# Patient Record
Sex: MALE | Race: WHITE | NOT HISPANIC OR LATINO | Employment: OTHER | URBAN - NONMETROPOLITAN AREA
[De-identification: names, ages, dates, MRNs, and addresses within clinical notes are randomized per-mention and may not be internally consistent; named-entity substitution may affect disease eponyms.]

---

## 2017-01-27 RX ORDER — CITALOPRAM 40 MG/1
TABLET ORAL
Qty: 30 TABLET | Refills: 0 | Status: SHIPPED | OUTPATIENT
Start: 2017-01-27

## 2017-02-02 ENCOUNTER — LAB (OUTPATIENT)
Dept: LAB | Facility: OTHER | Age: 72
End: 2017-02-02

## 2017-02-02 ENCOUNTER — OFFICE VISIT (OUTPATIENT)
Dept: FAMILY MEDICINE CLINIC | Facility: CLINIC | Age: 72
End: 2017-02-02

## 2017-02-02 VITALS — HEIGHT: 64 IN | SYSTOLIC BLOOD PRESSURE: 132 MMHG | DIASTOLIC BLOOD PRESSURE: 76 MMHG

## 2017-02-02 DIAGNOSIS — E11.621 DIABETIC ULCER OF RIGHT FOOT ASSOCIATED WITH TYPE 2 DIABETES MELLITUS (HCC): Primary | ICD-10-CM

## 2017-02-02 DIAGNOSIS — L97.519 DIABETIC ULCER OF RIGHT FOOT ASSOCIATED WITH TYPE 2 DIABETES MELLITUS (HCC): Primary | ICD-10-CM

## 2017-02-02 DIAGNOSIS — L97.519 DIABETIC ULCER OF RIGHT FOOT ASSOCIATED WITH TYPE 2 DIABETES MELLITUS (HCC): ICD-10-CM

## 2017-02-02 DIAGNOSIS — E11.621 DIABETIC ULCER OF RIGHT FOOT ASSOCIATED WITH TYPE 2 DIABETES MELLITUS (HCC): ICD-10-CM

## 2017-02-02 LAB
ALBUMIN SERPL-MCNC: 3.2 G/DL (ref 3.2–5.5)
ALBUMIN/GLOB SERPL: 0.7 G/DL (ref 1–3)
ALP SERPL-CCNC: 153 U/L (ref 15–121)
ALT SERPL W P-5'-P-CCNC: 29 U/L (ref 10–60)
ANION GAP SERPL CALCULATED.3IONS-SCNC: 14 MMOL/L (ref 5–15)
AST SERPL-CCNC: 37 U/L (ref 10–60)
BASOPHILS # BLD AUTO: 0.04 10*3/MM3 (ref 0–0.2)
BASOPHILS NFR BLD AUTO: 0.2 % (ref 0–2)
BILIRUB SERPL-MCNC: 2 MG/DL (ref 0.2–1)
BUN BLD-MCNC: 39 MG/DL (ref 8–25)
BUN/CREAT SERPL: 19.5 (ref 7–25)
CALCIUM SPEC-SCNC: 9.4 MG/DL (ref 8.4–10.8)
CHLORIDE SERPL-SCNC: 100 MMOL/L (ref 100–112)
CO2 SERPL-SCNC: 22 MMOL/L (ref 20–32)
CREAT BLD-MCNC: 2 MG/DL (ref 0.4–1.3)
DEPRECATED RDW RBC AUTO: 51.4 FL (ref 35.1–43.9)
EOSINOPHIL # BLD AUTO: 0.08 10*3/MM3 (ref 0–0.7)
EOSINOPHIL NFR BLD AUTO: 0.5 % (ref 0–7)
ERYTHROCYTE [DISTWIDTH] IN BLOOD BY AUTOMATED COUNT: 16.2 % (ref 11.5–14.5)
ERYTHROCYTE [SEDIMENTATION RATE] IN BLOOD: 50 MM/HR (ref 0–20)
GFR SERPL CREATININE-BSD FRML MDRD: 33 ML/MIN/1.73 (ref 49–113)
GLOBULIN UR ELPH-MCNC: 4.7 GM/DL (ref 2.5–4.6)
GLUCOSE BLD-MCNC: 104 MG/DL (ref 70–100)
HCT VFR BLD AUTO: 43.9 % (ref 39–49)
HGB BLD-MCNC: 14.5 G/DL (ref 13.7–17.3)
LYMPHOCYTES # BLD AUTO: 1.41 10*3/MM3 (ref 0.6–4.2)
LYMPHOCYTES NFR BLD AUTO: 8.5 % (ref 10–50)
MCH RBC QN AUTO: 28.9 PG (ref 26.5–34)
MCHC RBC AUTO-ENTMCNC: 33 G/DL (ref 31.5–36.3)
MCV RBC AUTO: 87.6 FL (ref 80–98)
MONOCYTES # BLD AUTO: 1.67 10*3/MM3 (ref 0–0.9)
MONOCYTES NFR BLD AUTO: 10 % (ref 0–12)
NEUTROPHILS # BLD AUTO: 13.47 10*3/MM3 (ref 2–8.6)
NEUTROPHILS NFR BLD AUTO: 80.8 % (ref 37–80)
PLATELET # BLD AUTO: 411 10*3/MM3 (ref 150–450)
PMV BLD AUTO: 9.7 FL (ref 8–12)
POTASSIUM BLD-SCNC: 4 MMOL/L (ref 3.4–5.4)
PROT SERPL-MCNC: 7.9 G/DL (ref 6.7–8.2)
RBC # BLD AUTO: 5.01 10*6/MM3 (ref 4.37–5.74)
SODIUM BLD-SCNC: 136 MMOL/L (ref 134–146)
WBC NRBC COR # BLD: 16.67 10*3/MM3 (ref 3.2–9.8)

## 2017-02-02 PROCEDURE — 85025 COMPLETE CBC W/AUTO DIFF WBC: CPT | Performed by: FAMILY MEDICINE

## 2017-02-02 PROCEDURE — 99214 OFFICE O/P EST MOD 30 MIN: CPT | Performed by: FAMILY MEDICINE

## 2017-02-02 PROCEDURE — 36415 COLL VENOUS BLD VENIPUNCTURE: CPT | Performed by: FAMILY MEDICINE

## 2017-02-02 PROCEDURE — 80053 COMPREHEN METABOLIC PANEL: CPT | Performed by: FAMILY MEDICINE

## 2017-02-02 PROCEDURE — 85651 RBC SED RATE NONAUTOMATED: CPT | Performed by: FAMILY MEDICINE

## 2017-02-02 RX ORDER — SULFAMETHOXAZOLE AND TRIMETHOPRIM 800; 160 MG/1; MG/1
1 TABLET ORAL 2 TIMES DAILY
Qty: 20 TABLET | Refills: 1 | Status: SHIPPED | OUTPATIENT
Start: 2017-02-02 | End: 2017-02-13 | Stop reason: SDUPTHER

## 2017-02-02 RX ORDER — ATORVASTATIN CALCIUM 40 MG/1
40 TABLET, FILM COATED ORAL DAILY
Qty: 30 TABLET | Refills: 5 | Status: SHIPPED | OUTPATIENT
Start: 2017-02-02

## 2017-02-02 NOTE — PROGRESS NOTES
Please call the patient regarding his abnormal result.  Thyroid shows presence of infection most likely and inflammation.  Also his kidney functions are significantly higher.  Ask if he sees a nephrologist and if not we need a referral ASAP

## 2017-02-02 NOTE — PROGRESS NOTES
Subjective   Josiah Del Castillo is a 71 y.o. male.  He has an open area on his right foot.  His wife said that she has tried to prop his foot up with a pillow but he still has a habit of rubbing the foot on the sheets.  About a week ago became open and it has been draining and his foot is becoming increasingly red and painful    History of Present Illness     The following portions of the patient's history were reviewed and updated as appropriate: allergies, current medications, past family history, past medical history, past social history, past surgical history and problem list.    Review of Systems   Constitutional: Positive for fatigue. Negative for activity change, appetite change, diaphoresis, fever and unexpected weight change.   HENT: Negative for congestion, ear pain, hearing loss, sinus pressure, sore throat, tinnitus, trouble swallowing and voice change.    Eyes: Negative.    Respiratory: Negative.    Cardiovascular: Negative.    Gastrointestinal: Negative for abdominal distention, abdominal pain, blood in stool, constipation, diarrhea, nausea and vomiting.   Endocrine: Negative.    Genitourinary: Negative for decreased urine volume, dysuria, frequency, hematuria and urgency.   Musculoskeletal: Positive for arthralgias, back pain, gait problem, joint swelling and myalgias.   Skin: Positive for wound.        R foot   Allergic/Immunologic: Negative.    Neurological: Positive for tremors, speech difficulty and weakness. Negative for dizziness, seizures, syncope, numbness and headaches.   Hematological: Negative.    Psychiatric/Behavioral: Negative for dysphoric mood and sleep disturbance. The patient is not nervous/anxious.        Objective   Physical Exam   Constitutional: He is oriented to person, place, and time. He appears well-developed and well-nourished. No distress.   HENT:   Head: Normocephalic and atraumatic.   Nose: Nose normal.   Mouth/Throat: Oropharynx is clear and moist.   Eyes: Conjunctivae and  EOM are normal. Pupils are equal, round, and reactive to light.   Neck: Normal range of motion. Neck supple. No JVD present. No tracheal deviation present. No thyromegaly present.   Cardiovascular: Normal rate, regular rhythm, normal heart sounds and intact distal pulses.    No murmur heard.  Pulmonary/Chest: Effort normal and breath sounds normal. He has no wheezes. He exhibits no tenderness.   Abdominal: Soft. Bowel sounds are normal. He exhibits no distension and no mass. There is no tenderness.   Musculoskeletal: Normal range of motion. He exhibits no edema or tenderness.   Induration and redness right foot medially from midfoot to the great toe with an open area centered over the first MTP joint with loose body skin, open area about 2 cm   Lymphadenopathy:     He has no cervical adenopathy.   Neurological: He is alert and oriented to person, place, and time. He displays abnormal reflex. He exhibits abnormal muscle tone. Coordination abnormal.   Skin: Skin is warm and dry. No rash noted. There is erythema. No pallor.   Psychiatric: He has a normal mood and affect.   Nursing note and vitals reviewed.      Assessment/Plan   Josiah was seen today for puncture wound.    Diagnoses and all orders for this visit:    Diabetic ulcer of right foot associated with type 2 diabetes mellitus  -     Ambulatory Referral to Podiatry  -     CBC & Differential; Future  -     Comprehensive Metabolic Panel; Future  -     Sedimentation Rate; Future    Other orders  -     sulfamethoxazole-trimethoprim (BACTRIM DS) 800-160 MG per tablet; Take 1 tablet by mouth 2 (Two) Times a Day.  -     atorvastatin (LIPITOR) 40 MG tablet; Take 1 tablet by mouth Daily.     will make podiatry referral, dressing is done today as well for continued dressing changes at home until then.  We'll see about getting him into wound management after the podiatry visit if indicated.  Will go ahead and start antibiotics and is a family is advised to take him to ER  for any signs of spreading redness infection or worsening.  Keep the foot elevated when possible.

## 2017-02-06 ENCOUNTER — OFFICE VISIT (OUTPATIENT)
Dept: PODIATRY | Facility: CLINIC | Age: 72
End: 2017-02-06

## 2017-02-06 VITALS — WEIGHT: 220 LBS | HEIGHT: 64 IN | BODY MASS INDEX: 37.56 KG/M2

## 2017-02-06 DIAGNOSIS — L97.513 FOOT ULCER, RIGHT, WITH NECROSIS OF MUSCLE (HCC): ICD-10-CM

## 2017-02-06 DIAGNOSIS — I73.9 PAD (PERIPHERAL ARTERY DISEASE) (HCC): Primary | ICD-10-CM

## 2017-02-06 DIAGNOSIS — L89.893: ICD-10-CM

## 2017-02-06 DIAGNOSIS — E11.42 DIABETIC POLYNEUROPATHY ASSOCIATED WITH TYPE 2 DIABETES MELLITUS (HCC): ICD-10-CM

## 2017-02-06 PROCEDURE — 99203 OFFICE O/P NEW LOW 30 MIN: CPT | Performed by: PODIATRIST

## 2017-02-06 NOTE — PROGRESS NOTES
Josiah Del Castillo  1945  71 y.o. male   PCP: Amirah Archuleta MD  BS: 83 this morning per patient's wife  Patient presents today wound on his right great toe.    02/06/2017  Chief Complaint   Patient presents with   • Right Foot - Wound Check           History of Present Illness    Mr Del Castillo is a 71-year-old male with history of diabetes and CVA with subsequent lower extremity contractures.  He presents for evaluation of right foot wound.  He states he believes he bumped this on something in the past couple of weeks was not sure what.  He was previously evaluated by Dr. Archuleta who referred him.  She did run preliminary labs which showed increased CRP and leukocytosis.  She started him on by mouth Bactrim.  His wife is been caring for the wound with peroxide, alcohol and bandaging.  He denies any current nausea, vomiting fever or chills        Past Medical History   Diagnosis Date   • Abscess of scalp    • Acute bronchitis    • Carotid artery stenosis      Athrosclerosis complete right ICA 70% left ICA   • Cellulitis of head (any part, except face)      Left ear     • Cellulitis of scalp    • Cerebrovascular accident    • Dizziness and giddiness    • Essential hypertension    • Hemiplegia      (L)      • History of respiratory therapy 06/19/2013     Nebulizer Treatment 25921 (1)  - RNatacha Negro   • Hyperlipidemia    • Hypertensive disorder    • Impacted cerumen    • Impaired glucose tolerance associated with insulin receptor abnormality    • Malignant neoplasm of prostate      Screening for malignant neoplasm of prostate    • 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    • Paronychia of toe    • Tinea cruris    • Transient cerebral ischemia    • Type 2 diabetes mellitus          Past Surgical History   Procedure Laterality Date   • Hernia repair       Age 11   • Coronary artery bypass graft  2000      x 3    • Other surgical history  06/19/2013     Remove Impacted Cerumen 92405 (1)  - RNatacha  Marky         Family History   Problem Relation Age of Onset   • Diabetes Other    • Heart disease Other          Social History     Social History   • Marital status:      Spouse name: N/A   • Number of children: N/A   • Years of education: N/A     Occupational History   • Not on file.     Social History Main Topics   • Smoking status: Former Smoker   • Smokeless tobacco: Not on file   • Alcohol use No   • Drug use: Not on file   • Sexual activity: Not on file     Other Topics Concern   • Not on file     Social History Narrative         Current Outpatient Prescriptions   Medication Sig Dispense Refill   • albuterol (PROVENTIL) (2.5 MG/3ML) 0.083% nebulizer solution Take 2.5 mg by nebulization Every 6 (Six) Hours As Needed.     • atorvastatin (LIPITOR) 40 MG tablet Take 1 tablet by mouth Daily. 30 tablet 5   • citalopram (CeleXA) 40 MG tablet TAKE 1 TABLET BY MOUTH DAILY **NEEDS LAB WORK** 30 tablet 0   • Cyanocobalamin-Methylcobalamin 600-600 MCG sublingual tablet Place 1 tablet under the tongue Daily.     • glucose blood (SOLUS V2 TEST) test strip Use one strip to test blood sugar 2 times every day for diabetes     • Insulin Glargine (LANTUS SOLOSTAR) 100 UNIT/ML injection pen Inject  under the skin. 20 units Subcutaneous every night at bed time for diabetes     • Insulin Pen Needle 30G X 8 MM misc 1 each Daily. 30 each 5   • sulfamethoxazole-trimethoprim (BACTRIM DS) 800-160 MG per tablet Take 1 tablet by mouth 2 (Two) Times a Day. 20 tablet 1   • TRUETEST TEST test strip CHECK BLOOD SUGAR TWICE DAILY 100 each 5   • valsartan (DIOVAN) 320 MG tablet TAKE 1 TABLET BY MOUTH DAILY 90 tablet 0   • warfarin (COUMADIN) 1 MG tablet Take  by mouth. 3 tablets daily     • warfarin (COUMADIN) 4 MG tablet Take 1 tablet by mouth daily. 30 tablet 5   • warfarin (COUMADIN) 5 MG tablet Take  by mouth. 1 TABLET EVERY PM       No current facility-administered medications for this visit.          OBJECTIVE    Visit Vitals  "  • Ht 64\" (162.6 cm)   • Wt 220 lb (99.8 kg)   • BMI 37.76 kg/m2         Review of Systems   Constitutional:  Denies recent weight loss, weight gain, fever or chills, no change in exercise tolerance  Musculoskeletal: Toe pain.   Skin:  Right foot ulcer.  Neurological:  Burning sensations to feet b/l.  Psychiatric/Behavioral: Denies depression    Physical Exam   Constitutional: He appears well-developed and well-nourished.   HEENT: Normocephalic. Atraumatic  CV: No tenderness. RRR  Resp: Non-labored respiration. No wheezes.   Lymphatic: No lymphadenopathy.   Psychiatric: He has a normal mood and affect. her   behavior is normal.      Lower Extremity Exam:  Vascular: DP/PT pulses non-palpable +signals on doppler.   Negative hair growth.   1+  Edema  Toes cool, CFT delayed  Neuro: Protective sensation absent, b/l.  DTRs hyperreflexive  Integument: No lesions.  Large 2.0 x 3.0 cm FT ulcer to plantar medial 1st MTPJ with probe to capsule/sesamoid  Mild periwound erythema, no purulence, no malodor. Some sloughing periwound epidermis  Web spaces c/d/i  Musculoskeletal: LE muscle strength 4/5.   Gait in wheelchair  Flexure contracture of hip, knee on right   Mild HAV with decreased ROM  Ankle ROM decreased without pain or crepitus  STJ ROM decreased          ASSESSMENT AND PLAN    Josiah was seen today for wound check.    Diagnoses and all orders for this visit:    PAD (peripheral artery disease)  -     Doppler Ankle Brachial Index Single Level CAR; Future    Foot ulcer, right, with necrosis of muscle    Diabetic polyneuropathy associated with type 2 diabetes mellitus    -Comprehensive DM foot exam performed. Pt educated on importance of tight glucose control and daily foot checks. Pt education materials dispensed.  -Pt educated on standard wound care staples including offloading, dressing changes, and serial debridements.  -PRAFO boot for offloading dispensed, to be worn at all times  -Concern for ability to heal wound due " to potential peripheral arterial disease. SHANNON with TBI ordered.  -Will have pt follow up with me in [place] following SHANNON next week            This document has been electronically signed by Narciso Maza DPM on February 7, 2017 2:41 PM     Narciso Maza DPM  2/7/2017  2:32 PM

## 2017-02-09 DIAGNOSIS — R94.4 ABNORMAL KIDNEY FUNCTION STUDY: ICD-10-CM

## 2017-02-09 DIAGNOSIS — N28.9 ABNORMAL KIDNEY FUNCTION: Primary | ICD-10-CM

## 2017-02-13 ENCOUNTER — OFFICE VISIT (OUTPATIENT)
Dept: PODIATRY | Facility: CLINIC | Age: 72
End: 2017-02-13

## 2017-02-13 VITALS — HEIGHT: 64 IN | WEIGHT: 220 LBS | BODY MASS INDEX: 37.56 KG/M2

## 2017-02-13 DIAGNOSIS — E11.42 DIABETIC POLYNEUROPATHY ASSOCIATED WITH TYPE 2 DIABETES MELLITUS (HCC): ICD-10-CM

## 2017-02-13 DIAGNOSIS — M79.671 RIGHT FOOT PAIN: ICD-10-CM

## 2017-02-13 DIAGNOSIS — IMO0001 WHEEL CHAIR AS AMBULATORY AID: ICD-10-CM

## 2017-02-13 DIAGNOSIS — I73.9 PAD (PERIPHERAL ARTERY DISEASE) (HCC): ICD-10-CM

## 2017-02-13 DIAGNOSIS — L97.513 FOOT ULCER WITH NECROSIS OF MUSCLE, RIGHT (HCC): Primary | ICD-10-CM

## 2017-02-13 PROCEDURE — 99214 OFFICE O/P EST MOD 30 MIN: CPT | Performed by: PODIATRIST

## 2017-02-13 RX ORDER — SULFAMETHOXAZOLE AND TRIMETHOPRIM 800; 160 MG/1; MG/1
1 TABLET ORAL 2 TIMES DAILY
Qty: 20 TABLET | Refills: 1 | Status: SHIPPED | OUTPATIENT
Start: 2017-02-13 | End: 2017-04-05 | Stop reason: HOSPADM

## 2017-02-13 NOTE — PROGRESS NOTES
Josiah Del Castillo  1945  71 y.o. male   PCP: Amirah Archuleta MD  BS: 83 this morning per patient's wife  Patient presents today wound on his right great toe.    02/13/2017  Chief Complaint   Patient presents with   • Right Foot - Wound Check           History of Present Illness    Mr Del Castillo is a 71-year-old male with history of diabetes and CVA with subsequent lower extremity contractures.  He presents for f/u evaluation of right foot wound.  He completed a round of Bactrim yesterday. We sent him for ABIs at last visit. He feels well overall.      Past Medical History   Diagnosis Date   • Abscess of scalp    • Acute bronchitis    • Carotid artery stenosis      Athrosclerosis complete right ICA 70% left ICA   • Cellulitis of head (any part, except face)      Left ear     • Cellulitis of scalp    • Cerebrovascular accident    • Dizziness and giddiness    • Essential hypertension    • Hemiplegia      (L)      • History of respiratory therapy 06/19/2013     Nebulizer Treatment 26875 (1)  - R. 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 Marky         Family History   Problem Relation Age of Onset   • Diabetes Other    • Heart disease Other          Social History     Social History   • Marital status:      Spouse name: N/A   • Number of children: N/A   • Years of education: N/A     Occupational History   • Not on file.     Social  "History Main Topics   • Smoking status: Former Smoker   • Smokeless tobacco: Not on file   • Alcohol use No   • Drug use: Not on file   • Sexual activity: Not on file     Other Topics Concern   • Not on file     Social History Narrative         Current Outpatient Prescriptions   Medication Sig Dispense Refill   • albuterol (PROVENTIL) (2.5 MG/3ML) 0.083% nebulizer solution Take 2.5 mg by nebulization Every 6 (Six) Hours As Needed.     • atorvastatin (LIPITOR) 40 MG tablet Take 1 tablet by mouth Daily. 30 tablet 5   • citalopram (CeleXA) 40 MG tablet TAKE 1 TABLET BY MOUTH DAILY **NEEDS LAB WORK** 30 tablet 0   • Cyanocobalamin-Methylcobalamin 600-600 MCG sublingual tablet Place 1 tablet under the tongue Daily.     • glucose blood (SOLUS V2 TEST) test strip Use one strip to test blood sugar 2 times every day for diabetes     • Insulin Glargine (LANTUS SOLOSTAR) 100 UNIT/ML injection pen Inject  under the skin. 20 units Subcutaneous every night at bed time for diabetes     • Insulin Pen Needle 30G X 8 MM misc 1 each Daily. 30 each 5   • sulfamethoxazole-trimethoprim (BACTRIM DS) 800-160 MG per tablet Take 1 tablet by mouth 2 (Two) Times a Day. 20 tablet 1   • TRUETEST TEST test strip CHECK BLOOD SUGAR TWICE DAILY 100 each 5   • valsartan (DIOVAN) 320 MG tablet TAKE 1 TABLET BY MOUTH DAILY 90 tablet 0   • warfarin (COUMADIN) 1 MG tablet Take  by mouth. 3 tablets daily     • warfarin (COUMADIN) 4 MG tablet Take 1 tablet by mouth daily. 30 tablet 5   • warfarin (COUMADIN) 5 MG tablet Take  by mouth. 1 TABLET EVERY PM       No current facility-administered medications for this visit.          OBJECTIVE    Visit Vitals   • Ht 64\" (162.6 cm)   • Wt 220 lb (99.8 kg)   • BMI 37.76 kg/m2         Review of Systems   Constitutional:  Denies recent weight loss, weight gain, fever or chills, no change in exercise tolerance  Musculoskeletal: Toe pain.   Skin:  Right foot ulcer.  Neurological:  Burning sensations to feet " b/l.  Psychiatric/Behavioral: Denies depression    Physical Exam   Constitutional: He appears well-developed and well-nourished.   HEENT: Normocephalic. Atraumatic  CV: No tenderness. RRR  Resp: Non-labored respiration. No wheezes.   Lymphatic: No lymphadenopathy.   Psychiatric: He has a normal mood and affect. her   behavior is normal.      Lower Extremity Exam:  Vascular: DP/PT pulses non-palpable +signals on doppler.   Negative hair growth.   1+  Edema  Toes cool, CFT delayed  Neuro: Protective sensation absent, b/l.  DTRs hyperreflexive  Integument: No lesions.  Large 2.0 x 3.0 cm FT ulcer to plantar medial 1st MTPJ with probe to capsule/sesamoid  Fibrous wound base, no purulence, no malodor. Erythema improving  Web spaces c/d/i  Musculoskeletal: LE muscle strength 4/5.   Gait in wheelchair  Flexure contracture of hip, knee on right   Mild HAV with decreased ROM  Ankle ROM decreased without pain or crepitus  STJ ROM decreased    SHANNON:  R- 0.93 L-Non-compressible      ASSESSMENT AND PLAN    Josiah was seen today for wound check.    Diagnoses and all orders for this visit:    Right foot pain  -     XR Foot 3+ View Right    Foot ulcer with necrosis of muscle, right  -     Ambulatory Referral to Home Health    Diabetic polyneuropathy associated with type 2 diabetes mellitus  -     Ambulatory Referral to Home Health    PAD (peripheral artery disease)    Wheel chair as ambulatory aid  -     Ambulatory Referral to Home Health    Other orders  -     sulfamethoxazole-trimethoprim (BACTRIM DS) 800-160 MG per tablet; Take 1 tablet by mouth 2 (Two) Times a Day.      -Comprehensive DM foot exam performed. Pt educated on importance of tight glucose control and daily foot checks. Pt education materials dispensed.  -Pt educated on standard wound care staples including offloading, dressing changes, and serial debridements.  -Erythema improving, but persistent. Rx additional 10 days Bactrim  -ABIs reviewed, RONALDL  -Medihoney, DSD  applied  -PRAFO to be worn at all times.  -Will obtain home health referral for local wound care, dressing supplied.  -F/u with me in 2 weeks            This document has been electronically signed by Narciso Maza DPM on February 13, 2017 1:32 PM     Narciso Maza DPM  2/13/2017  1:32 PM

## 2017-02-20 RX ORDER — INSULIN GLARGINE 100 [IU]/ML
INJECTION, SOLUTION SUBCUTANEOUS
Qty: 15 ML | Status: SHIPPED | OUTPATIENT
Start: 2017-02-20

## 2017-02-21 PROCEDURE — 85610 PROTHROMBIN TIME: CPT | Performed by: FAMILY MEDICINE

## 2017-02-22 RX ORDER — WARFARIN SODIUM 1 MG/1
1 TABLET ORAL
Qty: 90 TABLET | Refills: 5 | Status: SHIPPED | OUTPATIENT
Start: 2017-02-22 | End: 2017-04-05 | Stop reason: HOSPADM

## 2017-02-27 ENCOUNTER — LAB REQUISITION (OUTPATIENT)
Dept: LAB | Facility: HOSPITAL | Age: 72
End: 2017-02-27

## 2017-02-27 DIAGNOSIS — Z79.01 LONG TERM CURRENT USE OF ANTICOAGULANT: ICD-10-CM

## 2017-02-27 LAB
INR PPP: 4.5
PROTHROMBIN TIME: 53.5 SECONDS (ref 11–15)

## 2017-03-01 ENCOUNTER — OFFICE VISIT (OUTPATIENT)
Dept: FAMILY MEDICINE CLINIC | Facility: CLINIC | Age: 72
End: 2017-03-01

## 2017-03-01 ENCOUNTER — TELEPHONE (OUTPATIENT)
Dept: FAMILY MEDICINE CLINIC | Facility: CLINIC | Age: 72
End: 2017-03-01

## 2017-03-01 VITALS
SYSTOLIC BLOOD PRESSURE: 137 MMHG | BODY MASS INDEX: 37.56 KG/M2 | HEIGHT: 64 IN | WEIGHT: 220 LBS | DIASTOLIC BLOOD PRESSURE: 76 MMHG

## 2017-03-01 DIAGNOSIS — Z74.09 COMPLICATIONS OF IMMOBILITY: Primary | ICD-10-CM

## 2017-03-01 DIAGNOSIS — I63.9 CEREBROVASCULAR ACCIDENT (CVA), UNSPECIFIED MECHANISM (HCC): ICD-10-CM

## 2017-03-01 PROCEDURE — 99214 OFFICE O/P EST MOD 30 MIN: CPT | Performed by: FAMILY MEDICINE

## 2017-03-01 NOTE — PROGRESS NOTES
Subjective   Josiah Del Castillo is a 71 y.o. male. He is here today with his wife for evaluation for a hospital bed.  He has increasing weakness, generalized arthritis, and complications following a stroke.  He is nonambulatory and requires help to change positions.  A hospital bed as necessary to help keep him from getting decubitus areas on the skin being able to elevate his feet    History of Present Illness     The following portions of the patient's history were reviewed and updated as appropriate: allergies, current medications, past family history, past medical history, past social history, past surgical history and problem list.    Review of Systems   Constitutional: Positive for fatigue. Negative for activity change, appetite change, diaphoresis, fever and unexpected weight change.   HENT: Negative for congestion, ear pain, hearing loss, sinus pressure, sore throat, tinnitus, trouble swallowing and voice change.    Eyes: Negative.    Respiratory: Negative.    Cardiovascular: Negative.    Gastrointestinal: Negative for abdominal distention, abdominal pain, blood in stool, constipation, diarrhea, nausea and vomiting.   Endocrine: Negative.    Genitourinary: Negative for decreased urine volume, dysuria, frequency, hematuria and urgency.   Musculoskeletal: Positive for arthralgias, back pain, gait problem, joint swelling, myalgias, neck pain and neck stiffness.   Skin: Negative.    Allergic/Immunologic: Negative.    Neurological: Positive for weakness. Negative for dizziness, tremors, seizures, syncope, speech difficulty, numbness and headaches.   Hematological: Negative.    Psychiatric/Behavioral: Negative for dysphoric mood and sleep disturbance. The patient is not nervous/anxious.        Objective   Physical Exam   Constitutional: He is oriented to person, place, and time. He appears well-developed and well-nourished. No distress.   HENT:   Head: Normocephalic and atraumatic.   Nose: Nose normal.    Mouth/Throat: Oropharynx is clear and moist.   Eyes: Conjunctivae and EOM are normal. Pupils are equal, round, and reactive to light.   Neck: Normal range of motion. Neck supple. No JVD present. No tracheal deviation present. No thyromegaly present.   Cardiovascular: Normal rate, regular rhythm, normal heart sounds and intact distal pulses.    No murmur heard.  Pulmonary/Chest: Effort normal and breath sounds normal. He has no wheezes. He exhibits no tenderness.   Abdominal: Soft. Bowel sounds are normal. He exhibits no distension and no mass. There is no tenderness.   Musculoskeletal: He exhibits tenderness. He exhibits no edema.   Lymphadenopathy:     He has no cervical adenopathy.   Neurological: He is alert and oriented to person, place, and time. He displays abnormal reflex. He exhibits abnormal muscle tone. Coordination abnormal.   Overall joint stiffness, poor muscle tone   Skin: Skin is warm and dry. No rash noted. No erythema. No pallor.   Psychiatric: He has a normal mood and affect.   Nursing note and vitals reviewed.      Assessment/Plan   Josiah was seen today for follow-up.    Diagnoses and all orders for this visit:    Complications of immobility    Cerebrovascular accident (CVA), unspecified mechanism     will order a hospital bed, patient is to let me know if he needs any other medical equipment.

## 2017-03-01 NOTE — TELEPHONE ENCOUNTER
----- Message from Amirah Archuleta MD sent at 2/28/2017  4:49 PM CST -----  No change, repeat PT/INR in 2 weeks    ----- Message -----     From: Majo Lunsford     Sent: 2/28/2017   2:49 PM       To: Amirah Archuleta MD    HIS INR IS 2.9 AND PT IS 34.8... CAN CALL ANDRES AT [facility] WITH ORDERS [phone]

## 2017-03-13 ENCOUNTER — TRANSCRIBE ORDERS (OUTPATIENT)
Dept: PODIATRY | Facility: CLINIC | Age: 72
End: 2017-03-13

## 2017-03-13 ENCOUNTER — TELEPHONE (OUTPATIENT)
Dept: FAMILY MEDICINE CLINIC | Facility: CLINIC | Age: 72
End: 2017-03-13

## 2017-03-13 NOTE — TELEPHONE ENCOUNTER
Dayana from Home Health called stating Josiah's foot is very red and has an odor to it with some drainage. Wanting to know if you'd call something in for this.

## 2017-03-14 ENCOUNTER — DOCUMENTATION (OUTPATIENT)
Dept: FAMILY MEDICINE CLINIC | Facility: CLINIC | Age: 72
End: 2017-03-14

## 2017-03-14 RX ORDER — SULFAMETHOXAZOLE AND TRIMETHOPRIM 800; 160 MG/1; MG/1
1 TABLET ORAL 2 TIMES DAILY
Qty: 20 TABLET | Refills: 0 | Status: SHIPPED | OUTPATIENT
Start: 2017-03-14 | End: 2017-04-05 | Stop reason: HOSPADM

## 2017-03-27 ENCOUNTER — OFFICE VISIT (OUTPATIENT)
Dept: PODIATRY | Facility: CLINIC | Age: 72
End: 2017-03-27

## 2017-03-27 ENCOUNTER — HOSPITAL ENCOUNTER (INPATIENT)
Facility: HOSPITAL | Age: 72
LOS: 9 days | Discharge: NURSING, REHAB OR LONG-TERM CARE | End: 2017-04-05
Attending: INTERNAL MEDICINE | Admitting: INTERNAL MEDICINE

## 2017-03-27 ENCOUNTER — APPOINTMENT (OUTPATIENT)
Dept: CT IMAGING | Facility: HOSPITAL | Age: 72
End: 2017-03-27

## 2017-03-27 VITALS — BODY MASS INDEX: 37.56 KG/M2 | WEIGHT: 220 LBS | HEIGHT: 64 IN

## 2017-03-27 DIAGNOSIS — M86.171 ACUTE OSTEOMYELITIS OF RIGHT FOOT (HCC): Primary | ICD-10-CM

## 2017-03-27 DIAGNOSIS — L97.513 FOOT ULCER WITH NECROSIS OF MUSCLE, RIGHT (HCC): Primary | ICD-10-CM

## 2017-03-27 PROBLEM — L97.509 FOOT ULCER (HCC): Status: ACTIVE | Noted: 2017-03-27

## 2017-03-27 LAB
ALBUMIN SERPL-MCNC: 3.2 G/DL (ref 3.4–4.8)
ALBUMIN/GLOB SERPL: 0.8 G/DL (ref 1.1–1.8)
ALP SERPL-CCNC: 244 U/L (ref 38–126)
ALT SERPL W P-5'-P-CCNC: 57 U/L (ref 21–72)
ANION GAP SERPL CALCULATED.3IONS-SCNC: 10 MMOL/L (ref 5–15)
AST SERPL-CCNC: 57 U/L (ref 17–59)
BASOPHILS # BLD AUTO: 0.02 10*3/MM3 (ref 0–0.2)
BASOPHILS NFR BLD AUTO: 0.3 % (ref 0–2)
BILIRUB SERPL-MCNC: 0.9 MG/DL (ref 0.2–1.3)
BUN BLD-MCNC: 35 MG/DL (ref 7–21)
BUN/CREAT SERPL: 16.4 (ref 7–25)
CALCIUM SPEC-SCNC: 8.8 MG/DL (ref 8.4–10.2)
CHLORIDE SERPL-SCNC: 102 MMOL/L (ref 95–110)
CO2 SERPL-SCNC: 22 MMOL/L (ref 22–31)
CREAT BLD-MCNC: 2.13 MG/DL (ref 0.7–1.3)
DEPRECATED RDW RBC AUTO: 53.2 FL (ref 35.1–43.9)
EOSINOPHIL # BLD AUTO: 0.35 10*3/MM3 (ref 0–0.7)
EOSINOPHIL NFR BLD AUTO: 4.5 % (ref 0–7)
ERYTHROCYTE [DISTWIDTH] IN BLOOD BY AUTOMATED COUNT: 17 % (ref 11.5–14.5)
GFR SERPL CREATININE-BSD FRML MDRD: 31 ML/MIN/1.73 (ref 49–113)
GLOBULIN UR ELPH-MCNC: 3.8 GM/DL (ref 2.3–3.5)
GLUCOSE BLD-MCNC: 86 MG/DL (ref 60–100)
GLUCOSE BLDC GLUCOMTR-MCNC: 113 MG/DL (ref 70–130)
GLUCOSE BLDC GLUCOMTR-MCNC: 79 MG/DL (ref 70–130)
HBA1C MFR BLD: 5.09 % (ref 4–5.6)
HCT VFR BLD AUTO: 38.4 % (ref 39–49)
HGB BLD-MCNC: 13.2 G/DL (ref 13.7–17.3)
IMM GRANULOCYTES # BLD: 0.02 10*3/MM3 (ref 0–0.02)
IMM GRANULOCYTES NFR BLD: 0.3 % (ref 0–0.5)
LYMPHOCYTES # BLD AUTO: 1.47 10*3/MM3 (ref 0.6–4.2)
LYMPHOCYTES NFR BLD AUTO: 19 % (ref 10–50)
MCH RBC QN AUTO: 29.6 PG (ref 26.5–34)
MCHC RBC AUTO-ENTMCNC: 34.4 G/DL (ref 31.5–36.3)
MCV RBC AUTO: 86.1 FL (ref 80–98)
MONOCYTES # BLD AUTO: 0.95 10*3/MM3 (ref 0–0.9)
MONOCYTES NFR BLD AUTO: 12.3 % (ref 0–12)
NEUTROPHILS # BLD AUTO: 4.94 10*3/MM3 (ref 2–8.6)
NEUTROPHILS NFR BLD AUTO: 63.6 % (ref 37–80)
PLATELET # BLD AUTO: 340 10*3/MM3 (ref 150–450)
PMV BLD AUTO: 9 FL (ref 8–12)
POTASSIUM BLD-SCNC: 5.5 MMOL/L (ref 3.5–5.1)
PROT SERPL-MCNC: 7 G/DL (ref 6.3–8.6)
RBC # BLD AUTO: 4.46 10*6/MM3 (ref 4.37–5.74)
SODIUM BLD-SCNC: 134 MMOL/L (ref 137–145)
WBC NRBC COR # BLD: 7.75 10*3/MM3 (ref 3.2–9.8)

## 2017-03-27 PROCEDURE — 63710000001 INSULIN DETEMIR PER 5 UNITS: Performed by: PHYSICIAN ASSISTANT

## 2017-03-27 PROCEDURE — 87077 CULTURE AEROBIC IDENTIFY: CPT | Performed by: INTERNAL MEDICINE

## 2017-03-27 PROCEDURE — 87070 CULTURE OTHR SPECIMN AEROBIC: CPT | Performed by: INTERNAL MEDICINE

## 2017-03-27 PROCEDURE — 25010000002 PIPERACILLIN SOD-TAZOBACTAM PER 1 G: Performed by: PHYSICIAN ASSISTANT

## 2017-03-27 PROCEDURE — 87205 SMEAR GRAM STAIN: CPT | Performed by: INTERNAL MEDICINE

## 2017-03-27 PROCEDURE — 85025 COMPLETE CBC W/AUTO DIFF WBC: CPT | Performed by: INTERNAL MEDICINE

## 2017-03-27 PROCEDURE — 82962 GLUCOSE BLOOD TEST: CPT

## 2017-03-27 PROCEDURE — 87040 BLOOD CULTURE FOR BACTERIA: CPT | Performed by: INTERNAL MEDICINE

## 2017-03-27 PROCEDURE — 80053 COMPREHEN METABOLIC PANEL: CPT | Performed by: INTERNAL MEDICINE

## 2017-03-27 PROCEDURE — 73700 CT LOWER EXTREMITY W/O DYE: CPT

## 2017-03-27 PROCEDURE — 87186 SC STD MICRODIL/AGAR DIL: CPT | Performed by: INTERNAL MEDICINE

## 2017-03-27 PROCEDURE — 83036 HEMOGLOBIN GLYCOSYLATED A1C: CPT | Performed by: INTERNAL MEDICINE

## 2017-03-27 PROCEDURE — 99214 OFFICE O/P EST MOD 30 MIN: CPT | Performed by: PODIATRIST

## 2017-03-27 PROCEDURE — 25010000002 VANCOMYCIN PER 500 MG: Performed by: PHYSICIAN ASSISTANT

## 2017-03-27 RX ORDER — ATORVASTATIN CALCIUM 40 MG/1
40 TABLET, FILM COATED ORAL NIGHTLY
Status: DISCONTINUED | OUTPATIENT
Start: 2017-03-27 | End: 2017-04-05 | Stop reason: HOSPADM

## 2017-03-27 RX ORDER — SODIUM CHLORIDE 0.9 % (FLUSH) 0.9 %
SYRINGE (ML) INJECTION
Status: DISPENSED
Start: 2017-03-27 | End: 2017-03-28

## 2017-03-27 RX ORDER — CITALOPRAM 40 MG/1
40 TABLET ORAL DAILY
Status: DISCONTINUED | OUTPATIENT
Start: 2017-03-27 | End: 2017-04-05 | Stop reason: HOSPADM

## 2017-03-27 RX ORDER — DEXTROSE MONOHYDRATE 25 G/50ML
25 INJECTION, SOLUTION INTRAVENOUS
Status: DISCONTINUED | OUTPATIENT
Start: 2017-03-27 | End: 2017-04-05 | Stop reason: HOSPADM

## 2017-03-27 RX ORDER — SODIUM CHLORIDE 9 MG/ML
75 INJECTION, SOLUTION INTRAVENOUS CONTINUOUS
Status: DISCONTINUED | OUTPATIENT
Start: 2017-03-27 | End: 2017-04-05 | Stop reason: HOSPADM

## 2017-03-27 RX ORDER — HYDRALAZINE HYDROCHLORIDE 20 MG/ML
10 INJECTION INTRAMUSCULAR; INTRAVENOUS EVERY 6 HOURS PRN
Status: DISCONTINUED | OUTPATIENT
Start: 2017-03-27 | End: 2017-04-05 | Stop reason: HOSPADM

## 2017-03-27 RX ORDER — DOCUSATE SODIUM 100 MG/1
100 CAPSULE, LIQUID FILLED ORAL 2 TIMES DAILY PRN
Status: DISCONTINUED | OUTPATIENT
Start: 2017-03-27 | End: 2017-04-05 | Stop reason: HOSPADM

## 2017-03-27 RX ORDER — MORPHINE SULFATE 2 MG/ML
2 INJECTION, SOLUTION INTRAMUSCULAR; INTRAVENOUS
Status: DISCONTINUED | OUTPATIENT
Start: 2017-03-27 | End: 2017-04-05 | Stop reason: HOSPADM

## 2017-03-27 RX ORDER — ONDANSETRON 2 MG/ML
4 INJECTION INTRAMUSCULAR; INTRAVENOUS EVERY 6 HOURS PRN
Status: DISCONTINUED | OUTPATIENT
Start: 2017-03-27 | End: 2017-04-05 | Stop reason: HOSPADM

## 2017-03-27 RX ORDER — NYSTATIN 100000 U/G
CREAM TOPICAL AS NEEDED
Status: DISCONTINUED | OUTPATIENT
Start: 2017-03-27 | End: 2017-04-05 | Stop reason: HOSPADM

## 2017-03-27 RX ORDER — ALBUTEROL SULFATE 2.5 MG/3ML
2.5 SOLUTION RESPIRATORY (INHALATION) EVERY 6 HOURS PRN
Status: DISCONTINUED | OUTPATIENT
Start: 2017-03-27 | End: 2017-04-05 | Stop reason: HOSPADM

## 2017-03-27 RX ORDER — NICOTINE POLACRILEX 4 MG
15 LOZENGE BUCCAL
Status: DISCONTINUED | OUTPATIENT
Start: 2017-03-27 | End: 2017-04-05 | Stop reason: HOSPADM

## 2017-03-27 RX ORDER — NYSTATIN 100000 [USP'U]/G
POWDER TOPICAL AS NEEDED
Status: DISCONTINUED | OUTPATIENT
Start: 2017-03-27 | End: 2017-04-05 | Stop reason: HOSPADM

## 2017-03-27 RX ORDER — SODIUM CHLORIDE 0.9 % (FLUSH) 0.9 %
1-10 SYRINGE (ML) INJECTION AS NEEDED
Status: DISCONTINUED | OUTPATIENT
Start: 2017-03-27 | End: 2017-04-05 | Stop reason: HOSPADM

## 2017-03-27 RX ADMIN — SODIUM CHLORIDE 75 ML/HR: 900 INJECTION, SOLUTION INTRAVENOUS at 18:10

## 2017-03-27 RX ADMIN — INSULIN DETEMIR 20 UNITS: 100 INJECTION, SOLUTION SUBCUTANEOUS at 20:56

## 2017-03-27 RX ADMIN — CITALOPRAM HYDROBROMIDE 40 MG: 40 TABLET ORAL at 18:21

## 2017-03-27 RX ADMIN — VANCOMYCIN HYDROCHLORIDE 1500 MG: 1 INJECTION, POWDER, LYOPHILIZED, FOR SOLUTION INTRAVENOUS at 20:54

## 2017-03-27 RX ADMIN — TAZOBACTAM SODIUM AND PIPERACILLIN SODIUM 3.38 G: 375; 3 INJECTION, SOLUTION INTRAVENOUS at 18:21

## 2017-03-27 RX ADMIN — ATORVASTATIN CALCIUM 40 MG: 40 TABLET, FILM COATED ORAL at 20:54

## 2017-03-27 NOTE — PROGRESS NOTES
Josiah Liao Abundio  1945  71 y.o. male     This is a patient of Dr. SHIRLEY Maza, however he is out of town today. Patient presents today by ambulance due to a concerned call from Home Health. Wound check right foot.    3/27/2017  Chief Complaint   Patient presents with   • Right Foot - Follow-up           History of Present Illness    This is a 71-year-old male who presents to clinic today accompanied by his family.  This is a bedbound patient with history of stroke who currently resides at his home with home health care.  Patient has a full-thickness ulceration to his right foot.  Home health care noticed today that his right foot was looking exceptionally bad.  They were concerned so they came to have him evaluated.  This is a patient of Dr. Maza who has been treating him for this issue however Dr. Maza is out of town until tomorrow.  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    • Malignant neoplasm of prostate     Screening for malignant neoplasm of prostate    • 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    • Paronychia of toe    • Tinea cruris    • Transient cerebral ischemia    • Type 2 diabetes mellitus          Past Surgical History:   Procedure Laterality Date   • CORONARY ARTERY BYPASS GRAFT  2000     x 3    • HERNIA REPAIR      Age 11   • OTHER SURGICAL HISTORY  06/19/2013    Remove Impacted  [place] [address]10 (1)  - JOAQUIN Negro         Family History   Problem Relation Age of Onset   • Diabetes Other    • Heart disease Other          Social History     Social History   • Marital status:      Spouse name: N/A   • Number of children: N/A   • Years of education: N/A     Occupational History   • Not on file.     Social History Main Topics   • Smoking status: Former Smoker   • Smokeless tobacco: Not on file   • Alcohol use Not on file   • Drug use: No   • Sexual activity: Not on file     Other Topics Concern   • Not on file     Social History Narrative         Current Outpatient Prescriptions   Medication Sig Dispense Refill   • albuterol (PROVENTIL) (2.5 MG/3ML) 0.083% nebulizer solution Take 2.5 mg by nebulization Every 6 (Six) Hours As Needed.     • atorvastatin (LIPITOR) 40 MG tablet Take 1 tablet by mouth Daily. 30 tablet 5   • citalopram (CeleXA) 40 MG tablet TAKE 1 TABLET BY MOUTH DAILY **NEEDS LAB WORK** 30 tablet 0   • Cyanocobalamin-Methylcobalamin 600-600 MCG sublingual tablet Place 1 tablet under the tongue Daily.     • glucose blood (SOLUS V2 TEST) test strip Use one strip to test blood sugar 2 times every day for diabetes     • Insulin Pen Needle 30G X 8 MM misc 1 each Daily. 30 each 5   • LANTUS SOLOSTAR 100 UNIT/ML injection pen INJECT 20 UNITS SUBCUTANEOUSLY EVERY NIGHT AT BEDTIME 15 mL PRN   • sulfamethoxazole-trimethoprim (BACTRIM DS) 800-160 MG per tablet Take 1 tablet by mouth 2 (Two) Times a Day. 20 tablet 1   • sulfamethoxazole-trimethoprim (BACTRIM DS) 800-160 MG per tablet Take 1 tablet by mouth 2 (Two) Times a Day. 20 tablet 0   • TRUETEST TEST test strip CHECK BLOOD SUGAR TWICE DAILY 100 each 5   • valsartan (DIOVAN) 320 MG tablet TAKE 1 TABLET BY MOUTH DAILY 90 tablet 0   • warfarin (COUMADIN) 1 MG tablet Take 1 tablet by mouth Daily. 3 tablets daily 90 tablet 5   • warfarin (COUMADIN) 4 MG tablet Take 1 tablet by mouth daily. 30 tablet 5   • warfarin (COUMADIN) 5 MG tablet  "Take  by mouth. 1 TABLET EVERY PM       No current facility-administered medications for this visit.          OBJECTIVE    Ht 64\" (162.6 cm)  Wt 220 lb (99.8 kg)  BMI 37.76 kg/m2      Review of Systems   Constitutional: Negative for chills and fever.   Cardiovascular: Negative for chest pain.   Gastrointestinal: Negative for constipation, diarrhea, nausea and vomiting.   Skin: ulcer right foot  Musculoskeletal: right foot pain      Constitutional: well developed, well nourished    HEENT: Normocephalic and atraumatic, normal hearing    Respiratory: Non labored respirations noted    Cardiovascular:    DP/PT pulses non-palpable    CFT delayed to all digits  Skin temp is warm to cool from proximal tibia to distal digits  Pedal hair growth absent.   Erythema and edema noted to right foot surrounding the great toe joint    Musculoskeletal:  Muscle strength is 4/5 for all muscle groups tested     Dermatological:   Full thickness ulceration noted to the medial aspect of the right 1st MPJ. Ulcer measures 2.0 x 3.0cm. Probes to bond and joint. +drainage. +surrounding erythema.     Neurological:    Sensation intact to light touch        Psychiatric: A&O x 3 with normal mood and affect. NAD.         Procedures        ASSESSMENT AND PLAN    There are no diagnoses linked to this encounter.    - comprehensive foot exam performed  - Right foot appears to be worsening.  Pt now has exposed bone and joint with local signs of infection  - Recommend that patient be admitted to the hospital.  Patient will likely need amputation.  Level to be determined by Dr. Maza.  - Will speak to Dr. Maza about my findings.  - All questions were answered and the patient is in agreement with the current treatment plan.  - Dr Maza will follow this patient while in house.            This document has been electronically signed by Zainab Martinez on March 27, 2017 1:41 PM     3/27/2017  1:41 PM  "

## 2017-03-28 ENCOUNTER — APPOINTMENT (OUTPATIENT)
Dept: ULTRASOUND IMAGING | Facility: HOSPITAL | Age: 72
End: 2017-03-28

## 2017-03-28 LAB
ALBUMIN SERPL-MCNC: 2.8 G/DL (ref 3.4–4.8)
ALBUMIN/GLOB SERPL: 0.8 G/DL (ref 1.1–1.8)
ALP SERPL-CCNC: 193 U/L (ref 38–126)
ALT SERPL W P-5'-P-CCNC: 45 U/L (ref 21–72)
ANION GAP SERPL CALCULATED.3IONS-SCNC: 9 MMOL/L (ref 5–15)
AST SERPL-CCNC: 49 U/L (ref 17–59)
BASOPHILS # BLD AUTO: 0.04 10*3/MM3 (ref 0–0.2)
BASOPHILS NFR BLD AUTO: 0.4 % (ref 0–2)
BILIRUB SERPL-MCNC: 0.7 MG/DL (ref 0.2–1.3)
BUN BLD-MCNC: 33 MG/DL (ref 7–21)
BUN/CREAT SERPL: 15.3 (ref 7–25)
CALCIUM SPEC-SCNC: 8.2 MG/DL (ref 8.4–10.2)
CHLORIDE SERPL-SCNC: 109 MMOL/L (ref 95–110)
CO2 SERPL-SCNC: 18 MMOL/L (ref 22–31)
CREAT BLD-MCNC: 2.15 MG/DL (ref 0.7–1.3)
DEPRECATED RDW RBC AUTO: 55.4 FL (ref 35.1–43.9)
EOSINOPHIL # BLD AUTO: 0.51 10*3/MM3 (ref 0–0.7)
EOSINOPHIL NFR BLD AUTO: 5.4 % (ref 0–7)
ERYTHROCYTE [DISTWIDTH] IN BLOOD BY AUTOMATED COUNT: 17.4 % (ref 11.5–14.5)
GFR SERPL CREATININE-BSD FRML MDRD: 31 ML/MIN/1.73 (ref 49–113)
GLOBULIN UR ELPH-MCNC: 3.6 GM/DL (ref 2.3–3.5)
GLUCOSE BLD-MCNC: 77 MG/DL (ref 60–100)
GLUCOSE BLDC GLUCOMTR-MCNC: 115 MG/DL (ref 70–130)
GLUCOSE BLDC GLUCOMTR-MCNC: 169 MG/DL (ref 70–130)
GLUCOSE BLDC GLUCOMTR-MCNC: 71 MG/DL (ref 70–130)
GLUCOSE BLDC GLUCOMTR-MCNC: 83 MG/DL (ref 70–130)
HCT VFR BLD AUTO: 36 % (ref 39–49)
HGB BLD-MCNC: 11.7 G/DL (ref 13.7–17.3)
IMM GRANULOCYTES # BLD: 0.01 10*3/MM3 (ref 0–0.02)
IMM GRANULOCYTES NFR BLD: 0.1 % (ref 0–0.5)
LYMPHOCYTES # BLD AUTO: 1.97 10*3/MM3 (ref 0.6–4.2)
LYMPHOCYTES NFR BLD AUTO: 20.9 % (ref 10–50)
MCH RBC QN AUTO: 28.2 PG (ref 26.5–34)
MCHC RBC AUTO-ENTMCNC: 32.5 G/DL (ref 31.5–36.3)
MCV RBC AUTO: 86.7 FL (ref 80–98)
MONOCYTES # BLD AUTO: 1.12 10*3/MM3 (ref 0–0.9)
MONOCYTES NFR BLD AUTO: 11.9 % (ref 0–12)
NEUTROPHILS # BLD AUTO: 5.76 10*3/MM3 (ref 2–8.6)
NEUTROPHILS NFR BLD AUTO: 61.3 % (ref 37–80)
NRBC BLD MANUAL-RTO: 0 /100 WBC (ref 0–0)
PLATELET # BLD AUTO: 335 10*3/MM3 (ref 150–450)
PMV BLD AUTO: 8.5 FL (ref 8–12)
POTASSIUM BLD-SCNC: 4.7 MMOL/L (ref 3.5–5.1)
PROT SERPL-MCNC: 6.4 G/DL (ref 6.3–8.6)
RBC # BLD AUTO: 4.15 10*6/MM3 (ref 4.37–5.74)
SODIUM BLD-SCNC: 136 MMOL/L (ref 137–145)
WBC NRBC COR # BLD: 9.41 10*3/MM3 (ref 3.2–9.8)

## 2017-03-28 PROCEDURE — 63710000001 INSULIN DETEMIR PER 5 UNITS: Performed by: PHYSICIAN ASSISTANT

## 2017-03-28 PROCEDURE — 63710000001 INSULIN ASPART PER 5 UNITS: Performed by: INTERNAL MEDICINE

## 2017-03-28 PROCEDURE — 25010000002 PIPERACILLIN SOD-TAZOBACTAM PER 1 G: Performed by: PHYSICIAN ASSISTANT

## 2017-03-28 PROCEDURE — 82962 GLUCOSE BLOOD TEST: CPT

## 2017-03-28 PROCEDURE — 99233 SBSQ HOSP IP/OBS HIGH 50: CPT | Performed by: PODIATRIST

## 2017-03-28 PROCEDURE — 85025 COMPLETE CBC W/AUTO DIFF WBC: CPT | Performed by: INTERNAL MEDICINE

## 2017-03-28 PROCEDURE — 93925 LOWER EXTREMITY STUDY: CPT

## 2017-03-28 PROCEDURE — 80053 COMPREHEN METABOLIC PANEL: CPT | Performed by: INTERNAL MEDICINE

## 2017-03-28 PROCEDURE — 25010000002 HEPARIN (PORCINE) PER 1000 UNITS: Performed by: FAMILY MEDICINE

## 2017-03-28 RX ORDER — HEPARIN SODIUM 5000 [USP'U]/ML
5000 INJECTION, SOLUTION INTRAVENOUS; SUBCUTANEOUS EVERY 8 HOURS SCHEDULED
Status: DISCONTINUED | OUTPATIENT
Start: 2017-03-28 | End: 2017-03-30

## 2017-03-28 RX ADMIN — HEPARIN SODIUM 5000 UNITS: 5000 INJECTION, SOLUTION INTRAVENOUS; SUBCUTANEOUS at 21:20

## 2017-03-28 RX ADMIN — ATORVASTATIN CALCIUM 40 MG: 40 TABLET, FILM COATED ORAL at 20:35

## 2017-03-28 RX ADMIN — SODIUM CHLORIDE 75 ML/HR: 900 INJECTION, SOLUTION INTRAVENOUS at 10:02

## 2017-03-28 RX ADMIN — INSULIN ASPART 3 UNITS: 100 INJECTION, SOLUTION INTRAVENOUS; SUBCUTANEOUS at 17:43

## 2017-03-28 RX ADMIN — CITALOPRAM HYDROBROMIDE 40 MG: 40 TABLET ORAL at 08:40

## 2017-03-28 RX ADMIN — TAZOBACTAM SODIUM AND PIPERACILLIN SODIUM 2.25 G: 250; 2 INJECTION, SOLUTION INTRAVENOUS at 11:22

## 2017-03-28 RX ADMIN — INSULIN DETEMIR 20 UNITS: 100 INJECTION, SOLUTION SUBCUTANEOUS at 20:35

## 2017-03-28 RX ADMIN — TAZOBACTAM SODIUM AND PIPERACILLIN SODIUM 2.25 G: 250; 2 INJECTION, SOLUTION INTRAVENOUS at 06:11

## 2017-03-28 RX ADMIN — TAZOBACTAM SODIUM AND PIPERACILLIN SODIUM 2.25 G: 250; 2 INJECTION, SOLUTION INTRAVENOUS at 17:43

## 2017-03-28 RX ADMIN — HEPARIN SODIUM 5000 UNITS: 5000 INJECTION, SOLUTION INTRAVENOUS; SUBCUTANEOUS at 17:43

## 2017-03-28 RX ADMIN — TAZOBACTAM SODIUM AND PIPERACILLIN SODIUM 2.25 G: 250; 2 INJECTION, SOLUTION INTRAVENOUS at 02:05

## 2017-03-29 ENCOUNTER — ANESTHESIA (OUTPATIENT)
Dept: PERIOP | Facility: HOSPITAL | Age: 72
End: 2017-03-29

## 2017-03-29 ENCOUNTER — ANESTHESIA EVENT (OUTPATIENT)
Dept: PERIOP | Facility: HOSPITAL | Age: 72
End: 2017-03-29

## 2017-03-29 LAB
ALBUMIN SERPL-MCNC: 2.7 G/DL (ref 3.4–4.8)
ALBUMIN/GLOB SERPL: 0.8 G/DL (ref 1.1–1.8)
ALP SERPL-CCNC: 184 U/L (ref 38–126)
ALT SERPL W P-5'-P-CCNC: 37 U/L (ref 21–72)
ANION GAP SERPL CALCULATED.3IONS-SCNC: 10 MMOL/L (ref 5–15)
AST SERPL-CCNC: 37 U/L (ref 17–59)
BASOPHILS # BLD AUTO: 0.03 10*3/MM3 (ref 0–0.2)
BASOPHILS NFR BLD AUTO: 0.4 % (ref 0–2)
BILIRUB SERPL-MCNC: 0.5 MG/DL (ref 0.2–1.3)
BUN BLD-MCNC: 24 MG/DL (ref 7–21)
BUN/CREAT SERPL: 12.8 (ref 7–25)
CALCIUM SPEC-SCNC: 8 MG/DL (ref 8.4–10.2)
CHLORIDE SERPL-SCNC: 109 MMOL/L (ref 95–110)
CO2 SERPL-SCNC: 20 MMOL/L (ref 22–31)
CREAT BLD-MCNC: 1.87 MG/DL (ref 0.7–1.3)
DEPRECATED RDW RBC AUTO: 56.9 FL (ref 35.1–43.9)
EOSINOPHIL # BLD AUTO: 0.45 10*3/MM3 (ref 0–0.7)
EOSINOPHIL NFR BLD AUTO: 6.2 % (ref 0–7)
ERYTHROCYTE [DISTWIDTH] IN BLOOD BY AUTOMATED COUNT: 17.4 % (ref 11.5–14.5)
GFR SERPL CREATININE-BSD FRML MDRD: 36 ML/MIN/1.73 (ref 60–113)
GLOBULIN UR ELPH-MCNC: 3.5 GM/DL (ref 2.3–3.5)
GLUCOSE BLD-MCNC: 86 MG/DL (ref 60–100)
GLUCOSE BLDC GLUCOMTR-MCNC: 101 MG/DL (ref 70–130)
GLUCOSE BLDC GLUCOMTR-MCNC: 102 MG/DL (ref 70–130)
GLUCOSE BLDC GLUCOMTR-MCNC: 124 MG/DL (ref 70–130)
GLUCOSE BLDC GLUCOMTR-MCNC: 90 MG/DL (ref 70–130)
HCT VFR BLD AUTO: 35.8 % (ref 39–49)
HGB BLD-MCNC: 11.6 G/DL (ref 13.7–17.3)
IMM GRANULOCYTES # BLD: 0.03 10*3/MM3 (ref 0–0.02)
IMM GRANULOCYTES NFR BLD: 0.4 % (ref 0–0.5)
LYMPHOCYTES # BLD AUTO: 1.92 10*3/MM3 (ref 0.6–4.2)
LYMPHOCYTES NFR BLD AUTO: 26.6 % (ref 10–50)
MAGNESIUM SERPL-MCNC: 2.1 MG/DL (ref 1.6–2.3)
MCH RBC QN AUTO: 28.6 PG (ref 26.5–34)
MCHC RBC AUTO-ENTMCNC: 32.4 G/DL (ref 31.5–36.3)
MCV RBC AUTO: 88.4 FL (ref 80–98)
MONOCYTES # BLD AUTO: 0.77 10*3/MM3 (ref 0–0.9)
MONOCYTES NFR BLD AUTO: 10.7 % (ref 0–12)
NEUTROPHILS # BLD AUTO: 4.01 10*3/MM3 (ref 2–8.6)
NEUTROPHILS NFR BLD AUTO: 55.7 % (ref 37–80)
NRBC BLD MANUAL-RTO: 0 /100 WBC (ref 0–0)
PLATELET # BLD AUTO: 340 10*3/MM3 (ref 150–450)
PMV BLD AUTO: 8.6 FL (ref 8–12)
POTASSIUM BLD-SCNC: 4.6 MMOL/L (ref 3.5–5.1)
PROT SERPL-MCNC: 6.2 G/DL (ref 6.3–8.6)
RBC # BLD AUTO: 4.05 10*6/MM3 (ref 4.37–5.74)
SODIUM BLD-SCNC: 139 MMOL/L (ref 137–145)
VANCOMYCIN SERPL-MCNC: 9.49 MCG/ML
WBC NRBC COR # BLD: 7.21 10*3/MM3 (ref 3.2–9.8)

## 2017-03-29 PROCEDURE — 0JBQ0ZZ EXCISION OF RIGHT FOOT SUBCUTANEOUS TISSUE AND FASCIA, OPEN APPROACH: ICD-10-PCS | Performed by: PODIATRIST

## 2017-03-29 PROCEDURE — 82962 GLUCOSE BLOOD TEST: CPT

## 2017-03-29 PROCEDURE — 88311 DECALCIFY TISSUE: CPT | Performed by: PODIATRIST

## 2017-03-29 PROCEDURE — 25010000002 VANCOMYCIN PER 500 MG: Performed by: PHYSICIAN ASSISTANT

## 2017-03-29 PROCEDURE — 28820 AMPUTATION OF TOE: CPT | Performed by: PODIATRIST

## 2017-03-29 PROCEDURE — 88311 DECALCIFY TISSUE: CPT | Performed by: PATHOLOGY

## 2017-03-29 PROCEDURE — 25010000002 HEPARIN (PORCINE) PER 1000 UNITS: Performed by: FAMILY MEDICINE

## 2017-03-29 PROCEDURE — 0Y6M0Z9 DETACHMENT AT RIGHT FOOT, PARTIAL 1ST RAY, OPEN APPROACH: ICD-10-PCS | Performed by: PODIATRIST

## 2017-03-29 PROCEDURE — 88305 TISSUE EXAM BY PATHOLOGIST: CPT | Performed by: PODIATRIST

## 2017-03-29 PROCEDURE — 88305 TISSUE EXAM BY PATHOLOGIST: CPT | Performed by: PATHOLOGY

## 2017-03-29 PROCEDURE — 25010000002 PIPERACILLIN SOD-TAZOBACTAM PER 1 G: Performed by: PHYSICIAN ASSISTANT

## 2017-03-29 PROCEDURE — 83735 ASSAY OF MAGNESIUM: CPT | Performed by: FAMILY MEDICINE

## 2017-03-29 PROCEDURE — 85025 COMPLETE CBC W/AUTO DIFF WBC: CPT | Performed by: FAMILY MEDICINE

## 2017-03-29 PROCEDURE — 80202 ASSAY OF VANCOMYCIN: CPT | Performed by: INTERNAL MEDICINE

## 2017-03-29 PROCEDURE — 80053 COMPREHEN METABOLIC PANEL: CPT | Performed by: FAMILY MEDICINE

## 2017-03-29 PROCEDURE — 25010000002 PROPOFOL 10 MG/ML EMULSION: Performed by: NURSE ANESTHETIST, CERTIFIED REGISTERED

## 2017-03-29 PROCEDURE — 63710000001 INSULIN DETEMIR PER 5 UNITS: Performed by: PHYSICIAN ASSISTANT

## 2017-03-29 PROCEDURE — 25010000002 FENTANYL CITRATE (PF) 100 MCG/2ML SOLUTION: Performed by: NURSE ANESTHETIST, CERTIFIED REGISTERED

## 2017-03-29 PROCEDURE — 0HXMXZZ TRANSFER RIGHT FOOT SKIN, EXTERNAL APPROACH: ICD-10-PCS | Performed by: PODIATRIST

## 2017-03-29 RX ORDER — BUPIVACAINE HYDROCHLORIDE 5 MG/ML
INJECTION, SOLUTION EPIDURAL; INTRACAUDAL AS NEEDED
Status: DISCONTINUED | OUTPATIENT
Start: 2017-03-29 | End: 2017-04-05 | Stop reason: HOSPADM

## 2017-03-29 RX ORDER — PROPOFOL 10 MG/ML
VIAL (ML) INTRAVENOUS AS NEEDED
Status: DISCONTINUED | OUTPATIENT
Start: 2017-03-29 | End: 2017-03-29 | Stop reason: SURG

## 2017-03-29 RX ORDER — GLYCOPYRROLATE 0.2 MG/ML
INJECTION INTRAMUSCULAR; INTRAVENOUS AS NEEDED
Status: DISCONTINUED | OUTPATIENT
Start: 2017-03-29 | End: 2017-03-29 | Stop reason: SURG

## 2017-03-29 RX ORDER — ALBUTEROL SULFATE 2.5 MG/3ML
2.5 SOLUTION RESPIRATORY (INHALATION) ONCE
Status: COMPLETED | OUTPATIENT
Start: 2017-03-29 | End: 2017-03-29

## 2017-03-29 RX ORDER — FENTANYL CITRATE 50 UG/ML
INJECTION, SOLUTION INTRAMUSCULAR; INTRAVENOUS AS NEEDED
Status: DISCONTINUED | OUTPATIENT
Start: 2017-03-29 | End: 2017-03-29 | Stop reason: SURG

## 2017-03-29 RX ADMIN — SODIUM CHLORIDE 75 ML/HR: 900 INJECTION, SOLUTION INTRAVENOUS at 14:18

## 2017-03-29 RX ADMIN — TAZOBACTAM SODIUM AND PIPERACILLIN SODIUM 2.25 G: 250; 2 INJECTION, SOLUTION INTRAVENOUS at 06:11

## 2017-03-29 RX ADMIN — ATORVASTATIN CALCIUM 40 MG: 40 TABLET, FILM COATED ORAL at 20:38

## 2017-03-29 RX ADMIN — HEPARIN SODIUM 5000 UNITS: 5000 INJECTION, SOLUTION INTRAVENOUS; SUBCUTANEOUS at 06:11

## 2017-03-29 RX ADMIN — SODIUM CHLORIDE 75 ML/HR: 900 INJECTION, SOLUTION INTRAVENOUS at 01:38

## 2017-03-29 RX ADMIN — PROPOFOL 50 MG: 10 INJECTION, EMULSION INTRAVENOUS at 10:25

## 2017-03-29 RX ADMIN — TAZOBACTAM SODIUM AND PIPERACILLIN SODIUM 2.25 G: 250; 2 INJECTION, SOLUTION INTRAVENOUS at 14:18

## 2017-03-29 RX ADMIN — HEPARIN SODIUM 5000 UNITS: 5000 INJECTION, SOLUTION INTRAVENOUS; SUBCUTANEOUS at 21:10

## 2017-03-29 RX ADMIN — GLYCOPYRROLATE 0.2 MCG: 0.2 INJECTION, SOLUTION INTRAMUSCULAR; INTRAVENOUS at 10:25

## 2017-03-29 RX ADMIN — INSULIN DETEMIR 20 UNITS: 100 INJECTION, SOLUTION SUBCUTANEOUS at 20:38

## 2017-03-29 RX ADMIN — VANCOMYCIN HYDROCHLORIDE 1500 MG: 1 INJECTION, POWDER, LYOPHILIZED, FOR SOLUTION INTRAVENOUS at 18:27

## 2017-03-29 RX ADMIN — TAZOBACTAM SODIUM AND PIPERACILLIN SODIUM 2.25 G: 250; 2 INJECTION, SOLUTION INTRAVENOUS at 00:00

## 2017-03-29 RX ADMIN — FENTANYL CITRATE 25 MCG: 50 INJECTION, SOLUTION INTRAMUSCULAR; INTRAVENOUS at 10:46

## 2017-03-29 RX ADMIN — TAZOBACTAM SODIUM AND PIPERACILLIN SODIUM 2.25 G: 250; 2 INJECTION, SOLUTION INTRAVENOUS at 18:28

## 2017-03-29 RX ADMIN — ALBUTEROL SULFATE 2.5 MG: 2.5 SOLUTION RESPIRATORY (INHALATION) at 10:08

## 2017-03-29 RX ADMIN — HEPARIN SODIUM 5000 UNITS: 5000 INJECTION, SOLUTION INTRAVENOUS; SUBCUTANEOUS at 14:18

## 2017-03-29 NOTE — ANESTHESIA POSTPROCEDURE EVALUATION
Patient: Josiah Del Castillo    Procedure Summary     Date Anesthesia Start Anesthesia Stop Room / Location    03/29/17 1026 1115 [facility] 09 / [facility]       Procedure Diagnosis Surgeon Provider    RIGHT FOOT PARTIAL 1ST RAY AMPUTATION AND ALL OTHER INDICATED PROCEDURES (Right Foot) Acute osteomyelitis of right foot  (Acute osteomyelitis of right foot [M86.171]) BRANDO Land MD          Anesthesia Type: MAC  Last vitals  BP      Temp      Pulse     Resp      SpO2        Post Anesthesia Care and Evaluation    Patient location during evaluation: bedside  Patient participation: complete - patient participated  Level of consciousness: awake and alert  Pain management: adequate  Airway patency: patent  Anesthetic complications: No anesthetic complications    Cardiovascular status: acceptable  Respiratory status: acceptable  Hydration status: acceptable

## 2017-03-29 NOTE — ANESTHESIA PREPROCEDURE EVALUATION
Anesthesia Evaluation     Patient summary reviewed and Nursing notes reviewed   NPO Status: > 8 hours   Airway   Mallampati: II  TM distance: >3 FB  Neck ROM: full  possible difficult intubation  Dental    (+) poor dentation    Comment: Carious,loose,missing upper and lower teeth;very poor condition.    Pulmonary    (+) a smoker Former, COPD severe, rhonchi, decreased breath sounds, wheezes,     PE comment: Albuterol treatment given pre-op.  Cardiovascular - normal exam    PT is on anticoagulation therapy  Rhythm: regular  Rate: normal    (+) hypertension well controlled, CABG > 6 Months, PVD,       Neuro/Psych  (+) TIA (Carotid artery stenosis 100% occlusion of right and 70% occlusion of the left  ICA. ), CVA residual symptoms, dizziness/light headedness, dementia, poor historian.,    GI/Hepatic/Renal/Endo    (+)  chronic renal disease (Creatinine 1.87 3/29/17) CRI, diabetes mellitus type 2 well controlled using insulin,     Musculoskeletal (-) negative ROS    Abdominal    Substance History - negative use     OB/GYN negative ob/gyn ROS         Other - negative ROS                                   Anesthesia Plan    ASA 4     MAC     intravenous induction   Anesthetic plan and risks discussed with spouse/significant other, legal guardian, healthcare power of  and patient.

## 2017-03-30 LAB
BACTERIA SPEC AEROBE CULT: ABNORMAL
BETA LACTAMASE: ABNORMAL
GLUCOSE BLDC GLUCOMTR-MCNC: 105 MG/DL (ref 70–130)
GLUCOSE BLDC GLUCOMTR-MCNC: 61 MG/DL (ref 70–130)
GLUCOSE BLDC GLUCOMTR-MCNC: 68 MG/DL (ref 70–130)
GLUCOSE BLDC GLUCOMTR-MCNC: 69 MG/DL (ref 70–130)
GLUCOSE BLDC GLUCOMTR-MCNC: 95 MG/DL (ref 70–130)
GLUCOSE BLDC GLUCOMTR-MCNC: 99 MG/DL (ref 70–130)
GRAM STN SPEC: ABNORMAL
GRAM STN SPEC: ABNORMAL

## 2017-03-30 PROCEDURE — 25010000002 PIPERACILLIN SOD-TAZOBACTAM PER 1 G: Performed by: PHYSICIAN ASSISTANT

## 2017-03-30 PROCEDURE — 82962 GLUCOSE BLOOD TEST: CPT

## 2017-03-30 PROCEDURE — 25010000002 HEPARIN (PORCINE) PER 1000 UNITS: Performed by: FAMILY MEDICINE

## 2017-03-30 RX ORDER — ASPIRIN 81 MG/1
81 TABLET ORAL DAILY
Status: DISCONTINUED | OUTPATIENT
Start: 2017-03-30 | End: 2017-04-05 | Stop reason: HOSPADM

## 2017-03-30 RX ADMIN — CITALOPRAM HYDROBROMIDE 40 MG: 40 TABLET ORAL at 08:10

## 2017-03-30 RX ADMIN — TAZOBACTAM SODIUM AND PIPERACILLIN SODIUM 2.25 G: 250; 2 INJECTION, SOLUTION INTRAVENOUS at 00:35

## 2017-03-30 RX ADMIN — TAZOBACTAM SODIUM AND PIPERACILLIN SODIUM 2.25 G: 250; 2 INJECTION, SOLUTION INTRAVENOUS at 23:57

## 2017-03-30 RX ADMIN — SODIUM CHLORIDE 75 ML/HR: 900 INJECTION, SOLUTION INTRAVENOUS at 07:26

## 2017-03-30 RX ADMIN — TAZOBACTAM SODIUM AND PIPERACILLIN SODIUM 2.25 G: 250; 2 INJECTION, SOLUTION INTRAVENOUS at 06:53

## 2017-03-30 RX ADMIN — HEPARIN SODIUM 5000 UNITS: 5000 INJECTION, SOLUTION INTRAVENOUS; SUBCUTANEOUS at 13:24

## 2017-03-30 RX ADMIN — ATORVASTATIN CALCIUM 40 MG: 40 TABLET, FILM COATED ORAL at 20:43

## 2017-03-30 RX ADMIN — TAZOBACTAM SODIUM AND PIPERACILLIN SODIUM 2.25 G: 250; 2 INJECTION, SOLUTION INTRAVENOUS at 17:06

## 2017-03-30 RX ADMIN — SODIUM CHLORIDE 75 ML/HR: 900 INJECTION, SOLUTION INTRAVENOUS at 22:09

## 2017-03-30 RX ADMIN — ASPIRIN 81 MG: 81 TABLET, COATED ORAL at 17:07

## 2017-03-30 RX ADMIN — TAZOBACTAM SODIUM AND PIPERACILLIN SODIUM 2.25 G: 250; 2 INJECTION, SOLUTION INTRAVENOUS at 11:00

## 2017-03-30 RX ADMIN — HEPARIN SODIUM 5000 UNITS: 5000 INJECTION, SOLUTION INTRAVENOUS; SUBCUTANEOUS at 06:53

## 2017-03-31 LAB
ALBUMIN SERPL-MCNC: 2.6 G/DL (ref 3.4–4.8)
ALBUMIN/GLOB SERPL: 0.8 G/DL (ref 1.1–1.8)
ALP SERPL-CCNC: 162 U/L (ref 38–126)
ALT SERPL W P-5'-P-CCNC: 46 U/L (ref 21–72)
ANION GAP SERPL CALCULATED.3IONS-SCNC: 8 MMOL/L (ref 5–15)
AST SERPL-CCNC: 60 U/L (ref 17–59)
BASOPHILS # BLD AUTO: 0.02 10*3/MM3 (ref 0–0.2)
BASOPHILS NFR BLD AUTO: 0.2 % (ref 0–2)
BILIRUB SERPL-MCNC: 0.6 MG/DL (ref 0.2–1.3)
BUN BLD-MCNC: 16 MG/DL (ref 7–21)
BUN/CREAT SERPL: 10.2 (ref 7–25)
CALCIUM SPEC-SCNC: 8.2 MG/DL (ref 8.4–10.2)
CHLORIDE SERPL-SCNC: 107 MMOL/L (ref 95–110)
CO2 SERPL-SCNC: 24 MMOL/L (ref 22–31)
CREAT BLD-MCNC: 1.57 MG/DL (ref 0.7–1.3)
DEPRECATED RDW RBC AUTO: 53.1 FL (ref 35.1–43.9)
EOSINOPHIL # BLD AUTO: 0.45 10*3/MM3 (ref 0–0.7)
EOSINOPHIL NFR BLD AUTO: 4.7 % (ref 0–7)
ERYTHROCYTE [DISTWIDTH] IN BLOOD BY AUTOMATED COUNT: 16.8 % (ref 11.5–14.5)
GFR SERPL CREATININE-BSD FRML MDRD: 44 ML/MIN/1.73 (ref 60–113)
GLOBULIN UR ELPH-MCNC: 3.3 GM/DL (ref 2.3–3.5)
GLUCOSE BLD-MCNC: 99 MG/DL (ref 60–100)
GLUCOSE BLDC GLUCOMTR-MCNC: 100 MG/DL (ref 70–130)
GLUCOSE BLDC GLUCOMTR-MCNC: 110 MG/DL (ref 70–130)
GLUCOSE BLDC GLUCOMTR-MCNC: 99 MG/DL (ref 70–130)
HCT VFR BLD AUTO: 31.9 % (ref 39–49)
HGB BLD-MCNC: 10.6 G/DL (ref 13.7–17.3)
IMM GRANULOCYTES # BLD: 0.02 10*3/MM3 (ref 0–0.02)
IMM GRANULOCYTES NFR BLD: 0.2 % (ref 0–0.5)
LAB AP CASE REPORT: NORMAL
LYMPHOCYTES # BLD AUTO: 2.04 10*3/MM3 (ref 0.6–4.2)
LYMPHOCYTES NFR BLD AUTO: 21.5 % (ref 10–50)
Lab: NORMAL
MAGNESIUM SERPL-MCNC: 1.8 MG/DL (ref 1.6–2.3)
MCH RBC QN AUTO: 29 PG (ref 26.5–34)
MCHC RBC AUTO-ENTMCNC: 33.2 G/DL (ref 31.5–36.3)
MCV RBC AUTO: 87.4 FL (ref 80–98)
MONOCYTES # BLD AUTO: 1.41 10*3/MM3 (ref 0–0.9)
MONOCYTES NFR BLD AUTO: 14.8 % (ref 0–12)
NEUTROPHILS # BLD AUTO: 5.56 10*3/MM3 (ref 2–8.6)
NEUTROPHILS NFR BLD AUTO: 58.6 % (ref 37–80)
PATH REPORT.FINAL DX SPEC: NORMAL
PATH REPORT.GROSS SPEC: NORMAL
PLATELET # BLD AUTO: 316 10*3/MM3 (ref 150–450)
PMV BLD AUTO: 8.8 FL (ref 8–12)
POTASSIUM BLD-SCNC: 4.7 MMOL/L (ref 3.5–5.1)
PROT SERPL-MCNC: 5.9 G/DL (ref 6.3–8.6)
RBC # BLD AUTO: 3.65 10*6/MM3 (ref 4.37–5.74)
SODIUM BLD-SCNC: 139 MMOL/L (ref 137–145)
VANCOMYCIN TROUGH SERPL-MCNC: 10.87 MCG/ML (ref 10–15)
WBC NRBC COR # BLD: 9.5 10*3/MM3 (ref 3.2–9.8)

## 2017-03-31 PROCEDURE — 25010000002 PIPERACILLIN SOD-TAZOBACTAM PER 1 G: Performed by: PHYSICIAN ASSISTANT

## 2017-03-31 PROCEDURE — 85025 COMPLETE CBC W/AUTO DIFF WBC: CPT | Performed by: FAMILY MEDICINE

## 2017-03-31 PROCEDURE — 82962 GLUCOSE BLOOD TEST: CPT

## 2017-03-31 PROCEDURE — 63710000001 INSULIN DETEMIR PER 5 UNITS: Performed by: PHYSICIAN ASSISTANT

## 2017-03-31 PROCEDURE — 80053 COMPREHEN METABOLIC PANEL: CPT | Performed by: FAMILY MEDICINE

## 2017-03-31 PROCEDURE — 80202 ASSAY OF VANCOMYCIN: CPT | Performed by: PHYSICIAN ASSISTANT

## 2017-03-31 PROCEDURE — 83735 ASSAY OF MAGNESIUM: CPT | Performed by: FAMILY MEDICINE

## 2017-03-31 RX ADMIN — ASPIRIN 81 MG: 81 TABLET, COATED ORAL at 09:34

## 2017-03-31 RX ADMIN — TAZOBACTAM SODIUM AND PIPERACILLIN SODIUM 2.25 G: 250; 2 INJECTION, SOLUTION INTRAVENOUS at 11:22

## 2017-03-31 RX ADMIN — TAZOBACTAM SODIUM AND PIPERACILLIN SODIUM 2.25 G: 250; 2 INJECTION, SOLUTION INTRAVENOUS at 18:43

## 2017-03-31 RX ADMIN — TAZOBACTAM SODIUM AND PIPERACILLIN SODIUM 2.25 G: 250; 2 INJECTION, SOLUTION INTRAVENOUS at 05:27

## 2017-03-31 RX ADMIN — CITALOPRAM HYDROBROMIDE 40 MG: 40 TABLET ORAL at 09:34

## 2017-03-31 RX ADMIN — SODIUM CHLORIDE 75 ML/HR: 900 INJECTION, SOLUTION INTRAVENOUS at 13:40

## 2017-03-31 RX ADMIN — ATORVASTATIN CALCIUM 40 MG: 40 TABLET, FILM COATED ORAL at 21:06

## 2017-04-01 LAB
ALBUMIN SERPL-MCNC: 2.5 G/DL (ref 3.4–4.8)
ALBUMIN/GLOB SERPL: 0.7 G/DL (ref 1.1–1.8)
ALP SERPL-CCNC: 168 U/L (ref 38–126)
ALT SERPL W P-5'-P-CCNC: 38 U/L (ref 21–72)
ANION GAP SERPL CALCULATED.3IONS-SCNC: 7 MMOL/L (ref 5–15)
AST SERPL-CCNC: 48 U/L (ref 17–59)
BACTERIA SPEC AEROBE CULT: NORMAL
BACTERIA SPEC AEROBE CULT: NORMAL
BASOPHILS # BLD AUTO: 0.03 10*3/MM3 (ref 0–0.2)
BASOPHILS NFR BLD AUTO: 0.3 % (ref 0–2)
BILIRUB SERPL-MCNC: 0.5 MG/DL (ref 0.2–1.3)
BUN BLD-MCNC: 19 MG/DL (ref 7–21)
BUN/CREAT SERPL: 14.8 (ref 7–25)
CALCIUM SPEC-SCNC: 8 MG/DL (ref 8.4–10.2)
CHLORIDE SERPL-SCNC: 107 MMOL/L (ref 95–110)
CO2 SERPL-SCNC: 24 MMOL/L (ref 22–31)
CREAT BLD-MCNC: 1.28 MG/DL (ref 0.7–1.3)
DEPRECATED RDW RBC AUTO: 54 FL (ref 35.1–43.9)
EOSINOPHIL # BLD AUTO: 0.49 10*3/MM3 (ref 0–0.7)
EOSINOPHIL NFR BLD AUTO: 5.1 % (ref 0–7)
ERYTHROCYTE [DISTWIDTH] IN BLOOD BY AUTOMATED COUNT: 16.9 % (ref 11.5–14.5)
GFR SERPL CREATININE-BSD FRML MDRD: 56 ML/MIN/1.73 (ref 60–113)
GLOBULIN UR ELPH-MCNC: 3.5 GM/DL (ref 2.3–3.5)
GLUCOSE BLD-MCNC: 99 MG/DL (ref 60–100)
GLUCOSE BLDC GLUCOMTR-MCNC: 103 MG/DL (ref 70–130)
GLUCOSE BLDC GLUCOMTR-MCNC: 104 MG/DL (ref 70–130)
HCT VFR BLD AUTO: 31.5 % (ref 39–49)
HGB BLD-MCNC: 10.5 G/DL (ref 13.7–17.3)
IMM GRANULOCYTES # BLD: 0.03 10*3/MM3 (ref 0–0.02)
IMM GRANULOCYTES NFR BLD: 0.3 % (ref 0–0.5)
LYMPHOCYTES # BLD AUTO: 2.1 10*3/MM3 (ref 0.6–4.2)
LYMPHOCYTES NFR BLD AUTO: 21.9 % (ref 10–50)
MAGNESIUM SERPL-MCNC: 1.7 MG/DL (ref 1.6–2.3)
MCH RBC QN AUTO: 28.8 PG (ref 26.5–34)
MCHC RBC AUTO-ENTMCNC: 33.3 G/DL (ref 31.5–36.3)
MCV RBC AUTO: 86.5 FL (ref 80–98)
MONOCYTES # BLD AUTO: 1.41 10*3/MM3 (ref 0–0.9)
MONOCYTES NFR BLD AUTO: 14.7 % (ref 0–12)
NEUTROPHILS # BLD AUTO: 5.52 10*3/MM3 (ref 2–8.6)
NEUTROPHILS NFR BLD AUTO: 57.7 % (ref 37–80)
PLATELET # BLD AUTO: 312 10*3/MM3 (ref 150–450)
PMV BLD AUTO: 8.7 FL (ref 8–12)
POTASSIUM BLD-SCNC: 3.9 MMOL/L (ref 3.5–5.1)
PROT SERPL-MCNC: 6 G/DL (ref 6.3–8.6)
RBC # BLD AUTO: 3.64 10*6/MM3 (ref 4.37–5.74)
SODIUM BLD-SCNC: 138 MMOL/L (ref 137–145)
WBC NRBC COR # BLD: 9.58 10*3/MM3 (ref 3.2–9.8)

## 2017-04-01 PROCEDURE — 85025 COMPLETE CBC W/AUTO DIFF WBC: CPT | Performed by: FAMILY MEDICINE

## 2017-04-01 PROCEDURE — 83735 ASSAY OF MAGNESIUM: CPT | Performed by: FAMILY MEDICINE

## 2017-04-01 PROCEDURE — 80053 COMPREHEN METABOLIC PANEL: CPT | Performed by: FAMILY MEDICINE

## 2017-04-01 PROCEDURE — 82962 GLUCOSE BLOOD TEST: CPT

## 2017-04-01 PROCEDURE — 25010000002 PIPERACILLIN SOD-TAZOBACTAM PER 1 G: Performed by: PHYSICIAN ASSISTANT

## 2017-04-01 RX ADMIN — TAZOBACTAM SODIUM AND PIPERACILLIN SODIUM 2.25 G: 250; 2 INJECTION, SOLUTION INTRAVENOUS at 17:24

## 2017-04-01 RX ADMIN — TAZOBACTAM SODIUM AND PIPERACILLIN SODIUM 2.25 G: 250; 2 INJECTION, SOLUTION INTRAVENOUS at 00:00

## 2017-04-01 RX ADMIN — ASPIRIN 81 MG: 81 TABLET, COATED ORAL at 08:41

## 2017-04-01 RX ADMIN — TAZOBACTAM SODIUM AND PIPERACILLIN SODIUM 2.25 G: 250; 2 INJECTION, SOLUTION INTRAVENOUS at 11:08

## 2017-04-01 RX ADMIN — SODIUM CHLORIDE 75 ML/HR: 900 INJECTION, SOLUTION INTRAVENOUS at 05:15

## 2017-04-01 RX ADMIN — ATORVASTATIN CALCIUM 40 MG: 40 TABLET, FILM COATED ORAL at 21:08

## 2017-04-01 RX ADMIN — CITALOPRAM HYDROBROMIDE 40 MG: 40 TABLET ORAL at 08:41

## 2017-04-01 RX ADMIN — TAZOBACTAM SODIUM AND PIPERACILLIN SODIUM 2.25 G: 250; 2 INJECTION, SOLUTION INTRAVENOUS at 05:14

## 2017-04-01 NOTE — PROGRESS NOTES
[facility] Medicine Services  INPATIENT PROGRESS NOTE    Length of Stay: 4  Date of Admission: 3/27/2017  Primary Care Physician: Amirah Archuleta MD    Subjective   Chief Complaint:  Worsening right foot ulcer    HPI: 71-year-old  male presented to the ER with complaints of worsening ulcer of the right foot.  Patient has had history of chronic nonhealing wound on the right foot for which she is seeing podiatrist.  Patient was also being followed by home health.  The home health nurse noted that the wound was progressively getting worse and extending up the bone.  Patient was admitted to the inpatient and started on vancomycin and Zosyn.  Blood cultures and wound cultures were obtained.  Consult from the podiatrist (Dr. Maza) obtained-patient underwent partial first ray amputation today.    Today patient claims to be doing fairly well he denies any pain in the foot.  Denies any fever or chills.  No fresh complains reported this morning.  Awaiting nursing home placement    Review of Systems   Constitutional: Negative for chills and fever.   HENT: Negative for congestion and trouble swallowing.    Respiratory: Negative for cough and shortness of breath.    Cardiovascular: Negative for chest pain and leg swelling.   Gastrointestinal: Negative for abdominal pain, diarrhea and nausea.   Genitourinary: Negative for decreased urine volume and dysuria.   Musculoskeletal: Negative for back pain, gait problem and neck stiffness.   Neurological: Negative for syncope, speech difficulty and headaches.   Psychiatric/Behavioral: Negative for behavioral problems and confusion.     All pertinent negatives and positives are as above. All other systems have been reviewed and are negative unless otherwise stated.     Objective    Temp:  [97.3 °F (36.3 °C)-98.6 °F (37 °C)] 97.4 °F (36.3 °C)  Heart Rate:  [67-86] 67  Resp:  [18] 18  BP: (112-129)/(57-75) 112/59  Physical Exam    Constitutional: He appears well-developed and well-nourished.   HENT:   Head: Normocephalic and atraumatic.   Eyes: EOM are normal. Pupils are equal, round, and reactive to light.   Cardiovascular: Normal rate, regular rhythm and intact distal pulses.    Pulmonary/Chest: Effort normal and breath sounds normal.   Abdominal: Soft. Bowel sounds are normal.   Musculoskeletal: Normal range of motion.   Neurological: He is alert. He has normal reflexes.   Skin: Skin is warm.   Right foot ulcer- First MTP joint with evidence of infection    Psychiatric: He has a normal mood and affect. His behavior is normal.   Vitals reviewed.    Results Review:  I have reviewed the labs, radiology results, and diagnostic studies.    Laboratory Data:   Lab Results (last 24 hours)     Procedure Component Value Units Date/Time    POC Glucose Fingerstick [[id number]]  (Normal) Collected:  03/30/17 2042    Specimen:  Blood Updated:  03/30/17 2103     Glucose 99 mg/dL       Meter: [id number]: [id number] SENDY TOLBERT       CBC & Differential [[id number]] Collected:  03/31/17 0602    Specimen:  Blood Updated:  03/31/17 0628    Narrative:       The following orders were created for panel order CBC & Differential.  Procedure                               Abnormality         Status                     ---------                               -----------         ------                     CBC Auto Differential[[id number]]         Abnormal            Final result                 Please view results for these tests on the individual orders.    CBC Auto Differential [[id number]]  (Abnormal) Collected:  03/31/17 0602    Specimen:  Blood Updated:  03/31/17 0628     WBC 9.50 10*3/mm3      RBC 3.65 (L) 10*6/mm3      Hemoglobin 10.6 (L) g/dL      Hematocrit 31.9 (L) %      MCV 87.4 fL      MCH 29.0 pg      MCHC 33.2 g/dL      RDW 16.8 (H) %      RDW-SD 53.1 (H) fl      MPV 8.8 fL      Platelets 316 10*3/mm3      Neutrophil % 58.6 %      Lymphocyte % 21.5  %      Monocyte % 14.8 (H) %      Eosinophil % 4.7 %      Basophil % 0.2 %      Immature Grans % 0.2 %      Neutrophils, Absolute 5.56 10*3/mm3      Lymphocytes, Absolute 2.04 10*3/mm3      Monocytes, Absolute 1.41 (H) 10*3/mm3      Eosinophils, Absolute 0.45 10*3/mm3      Basophils, Absolute 0.02 10*3/mm3      Immature Grans, Absolute 0.02 10*3/mm3     Magnesium [[id number]]  (Normal) Collected:  03/31/17 0602    Specimen:  Blood Updated:  03/31/17 0639     Magnesium 1.8 mg/dL     POC Glucose Fingerstick [[id number]]  (Normal) Collected:  03/31/17 0629    Specimen:  Blood Updated:  03/31/17 0642     Glucose 99 mg/dL       Meter: [id number]: [id number] [facility]       Comprehensive Metabolic Panel [[id number]]  (Abnormal) Collected:  03/31/17 0602    Specimen:  Blood Updated:  03/31/17 0654     Glucose 99 mg/dL      BUN 16 mg/dL      Creatinine 1.57 (H) mg/dL      Sodium 139 mmol/L      Potassium 4.7 mmol/L      Chloride 107 mmol/L      CO2 24.0 mmol/L      Calcium 8.2 (L) mg/dL      Total Protein 5.9 (L) g/dL      Albumin 2.60 (L) g/dL      ALT (SGPT) 46 U/L      AST (SGOT) 60 (H) U/L      Alkaline Phosphatase 162 (H) U/L      Total Bilirubin 0.6 mg/dL      eGFR Non African Amer 44 (L) mL/min/1.73      Globulin 3.3 gm/dL      A/G Ratio 0.8 (L) g/dL      BUN/Creatinine Ratio 10.2     Anion Gap 8.0 mmol/L     POC Glucose Fingerstick [[id number]]  (Normal) Collected:  03/31/17 1118    Specimen:  Blood Updated:  03/31/17 1146     Glucose 100 mg/dL       RN NotifiedMeter: [id number]: [id number]  PARAS       POC Glucose Fingerstick [[id number]]  (Normal) Collected:  03/31/17 1614    Specimen:  Blood Updated:  03/31/17 1658     Glucose 110 mg/dL       Meter: [id number]: [id number]  PARAS       Blood Culture [[id number]]  (Normal) Collected:  03/27/17 1718    Specimen:  Blood from Hand, Right Updated:  03/31/17 1801     Blood Culture No growth at 4 days    Blood Culture [[id number]]   "(Normal) Collected:  03/27/17 1707    Specimen:  Blood from Arm, Right Updated:  03/31/17 1801     Blood Culture No growth at 4 days    Vancomycin, Trough [[id number]]  (Normal) Collected:  03/31/17 1704    Specimen:  Blood Updated:  03/31/17 1814     Vancomycin Trough 10.87 mcg/mL     Tissue Exam [[id number]] Collected:  03/29/17 1118    Specimen:  Tissue from Toe, Right Updated:  03/31/17 1940     Case Report --     Surgical Pathology Report                         Case: [id number]                                  Authorizing Provider:  Narciso Maza DPM     Collected:           03/29/2017 11:18 AM          Ordering Location:     [facility]             Received:            03/29/2017 01:36 PM                                 [facility] [place]                                                              Pathologist:           Dilip Maharaj MD                                                            Specimen:    Toe, Right, right big toe                                                                   Final Diagnosis --     RIGHT GREAT TOE, TRANSMETATARSAL AMPUTATION:  OSTEOMYELITIS OF THE DISTAL METATARSAL.       Gross Description --     The specimen is labeled \"right big toe\".  The toe, including both phalanges, measures 6.0 x 3.5 x 3.5 cm.  Also present in the container is a segment of long bone, presumably the distal first metatarsal, measuring 5.0 x 2.2 x 2.3 cm.  This is disrupted near the distal tip.  Also present in the container is a segment of tendon measuring 4.0 cm in length and 0.8 x 0.4 cm in cross sectional area.  Finally, an irregular fragment of soft tissue measures 3.0 x 3.5 x 1.5 cm.  Sections of soft tissue are submitted in block 1A, and a section of distal metatarsal is submitted for decalcification.       Embedded Images --          Culture Data:   No results found for: BLOODCX  No results found for: URINECX  No results found for: RESPCX  No results found for: WOUNDCX  No results found " for: STOOLCX  No components found for: BODYFLD    Radiology Data:   Imaging Results (last 24 hours)     Procedure Component Value Units Date/Time    CT Lower Extremity Right Without Contrast [[id number]] Collected:  03/27/17 2124     Updated:  03/27/17 2136    Narrative:         [facility]    Patient Name: LUIS ARIAS    ORDERING: JUANITA SEGURA    ATTENDING: WAYNE RODRIGUEZ     REFERRING: JUANITA SEGURA  -----------------------    PROCEDURE: CT right ankle and foot without contrast on 3/27/2017    CLINICAL INDICATION:  Right foot ulcer, osteomyelitis    TECHNIQUE: Multiple axial images are obtained throughout the  right ankle and foot without the administration of contrast.  Sagittal and coronal reformatted images are also performed and  reviewed. This study was performed with techniques to keep  radiation doses as low as reasonably achievable, (ALARA).   Total DLP is 206.7 mGy*cm.    COMPARISON: Right foot x-ray from 2/13/2017     FINDINGS: There is diffuse osteopenia. There is large soft tissue  ulceration adjacent to the head of the first metatarsal and first  MTP joint along its medial aspect. There is associated adjacent  air within the soft tissues and extending into the first MTP  joint. Extensive soft tissue changes are noted around this site  consistent with infection and cellulitis. There is bony erosion  of the head and neck of the first metatarsal especially along its  medial plantar aspect consistent with osteomyelitis. There is  also some bony erosion with adjacent air in the base of the first  proximal phalanx consistent with osteomyelitis at this location  as well. There is subluxation of the first MTP joint with  inferior positioning of the first metatarsal. The first  metatarsal head inferiorly appears to be exposed bone at the site  of osteomyelitis but please correlate clinically. Vascular  calcifications are noted. No other definite CT site of  osteomyelitis is noted.  Osteomyelitis involving the first  metatarsal extends proximally from the head of the first  metatarsal at least 2.3 cm into the first metatarsal diaphysis.  No definite fluid collection to suggest abscess is noted.      Impression:       CONCLUSION: Open ulceration adjacent to the first MTP joint with  evidence of infection and osteomyelitis involving the distal  first metatarsal and the base of the first proximal phalanx.    Electronically signed by:  Zcah Kyle  3/27/2017 9:35 PM  CDT Workstation: -[id number]          I have reviewed the patient current medications.   Scheduled Meds:    aspirin 81 mg Oral Daily   atorvastatin 40 mg Oral Nightly   citalopram 40 mg Oral Daily   insulin aspart 0-14 Units Subcutaneous 4x Daily AC & at Bedtime   insulin detemir 20 Units Subcutaneous Nightly   piperacillin-tazobactam 2.25 g Intravenous Q6H     Continuous Infusions:    Pharmacy Consult     sodium chloride 75 mL/hr Last Rate: Stopped (03/31/17 2703)     PRN Meds:.•  albuterol  •  bupivacaine (PF)  •  dextrose  •  dextrose  •  docusate sodium  •  glucagon (human recombinant)  •  hydrALAZINE  •  influenza vaccine  •  Morphine  •  nystatin  •  nystatin  •  ondansetron  •  Pharmacy Consult  •  pneumococcal polysaccharide 23-valent  •  sodium chloride      Assessment/Plan     71-year-old admitted for    1. Right foot ulcer- chronic nonhealing probably due to combination of diabetes and peripheral vascular disease.  Patient was started on vancomycin and Zosyn.  Wound care was consulted.  Podiatrist was also consulted- patient underwent are partial first ray amputation today.  Wound culture showed growth of Proteus mirabilis sensitive to Zosyn.  Vancomycin was discontinued     2. Osteomyelitis- CT scan shows ulceration adjacent to the first MTP joint with evidence of infection and osteomyelitis involving the distal first metatarsal and base of the proximal phalanx. Patient underwent are partial first ray amputation  today.    3. History of CVA- stable, no new deficits     4. DM II, insulin dependent- blood sugars fairly controlled, continue the patient on insulin sliding scale and ADA diet    5.  DVT prophylaxis patient was on heparin, This has to be stopped due to nose bleed. Can not use SCD and MIESHA due significant PVD.    6.  Chronic kidney disease creatinine appears to be at its baseline.    Discharge Planning: I expect patient to be discharged to home with home health [facility] in 1-2 days.    Rima Keating MD   03/31/17   7:45 PM

## 2017-04-01 NOTE — PLAN OF CARE
Problem: Patient Care Overview (Adult)  Goal: Plan of Care Review  Outcome: Ongoing (interventions implemented as appropriate)    04/01/17 1519   Patient Care Overview   Progress no change   Outcome Evaluation   Outcome Summary/Follow up Plan VSS, no c/o pain   Coping/Psychosocial Response Interventions   Plan Of Care Reviewed With patient       Goal: Adult Individualization and Mutuality  Outcome: Ongoing (interventions implemented as appropriate)  Goal: Discharge Needs Assessment  Outcome: Ongoing (interventions implemented as appropriate)    Problem: Pressure Ulcer (Adult)  Goal: Signs and Symptoms of Listed Potential Problems Will be Absent or Manageable (Pressure Ulcer)  Outcome: Ongoing (interventions implemented as appropriate)    Problem: Fall Risk (Adult)  Goal: Absence of Falls  Outcome: Ongoing (interventions implemented as appropriate)

## 2017-04-01 NOTE — PLAN OF CARE
Problem: Patient Care Overview (Adult)  Goal: Plan of Care Review  Outcome: Ongoing (interventions implemented as appropriate)    03/31/17 2030 04/01/17 0340   Patient Care Overview   Progress --  progress toward functional goals as expected   Outcome Evaluation   Outcome Summary/Follow up Plan --  Pt rested well, VS stable, no c/o pain   Coping/Psychosocial Response Interventions   Plan Of Care Reviewed With patient --        Goal: Discharge Needs Assessment  Outcome: Ongoing (interventions implemented as appropriate)    Problem: Pressure Ulcer (Adult)  Goal: Signs and Symptoms of Listed Potential Problems Will be Absent or Manageable (Pressure Ulcer)  Outcome: Ongoing (interventions implemented as appropriate)    Problem: Fall Risk (Adult)  Goal: Absence of Falls  Outcome: Ongoing (interventions implemented as appropriate)

## 2017-04-01 NOTE — PROGRESS NOTES
[facility] Medicine Services  INPATIENT PROGRESS NOTE    Length of Stay: 5  Date of Admission: 3/27/2017  Primary Care Physician: Amirah Archuleta MD    Subjective   Chief Complaint:  Worsening right foot ulcer    HPI: 71-year-old  male presented to the ER with complaints of worsening ulcer of the right foot.  Patient has had history of chronic nonhealing wound on the right foot for which she is seeing podiatrist.  Patient was also being followed by home health.  The home health nurse noted that the wound was progressively getting worse and extending up the bone.  Patient was admitted to the inpatient and started on vancomycin and Zosyn.  Blood cultures and wound cultures were obtained.  Consult from the podiatrist (Dr. Maza) obtained-patient underwent partial first ray amputation today.    Today patient claims to be doing fairly well he denies any pain in the foot.  Denies any fever or chills.  No fresh complains reported this morning.  PT/OT consulted  Awaiting nursing home placement    Review of Systems   Constitutional: Negative for chills and fever.   HENT: Negative for congestion and trouble swallowing.    Respiratory: Negative for cough and shortness of breath.    Cardiovascular: Negative for chest pain and leg swelling.   Gastrointestinal: Negative for abdominal pain, diarrhea and nausea.   Genitourinary: Negative for decreased urine volume and dysuria.   Musculoskeletal: Negative for back pain, gait problem and neck stiffness.   Neurological: Negative for syncope, speech difficulty and headaches.   Psychiatric/Behavioral: Negative for behavioral problems and confusion.     All pertinent negatives and positives are as above. All other systems have been reviewed and are negative unless otherwise stated.     Objective    Temp:  [96.2 °F (35.7 °C)-98 °F (36.7 °C)] 96.2 °F (35.7 °C)  Heart Rate:  [70-80] 80  Resp:  [16-18] 18  BP: (108-147)/(58-77)  "124/70  Physical Exam   Constitutional: He appears well-developed and well-nourished.   HENT:   Head: Normocephalic and atraumatic.   Eyes: EOM are normal. Pupils are equal, round, and reactive to light.   Cardiovascular: Normal rate, regular rhythm and intact distal pulses.    Pulmonary/Chest: Effort normal and breath sounds normal.   Abdominal: Soft. Bowel sounds are normal.   Musculoskeletal: Normal range of motion.   Neurological: He is alert. He has normal reflexes.   Skin: Skin is warm.   Right foot ulcer- First MTP joint with evidence of infection    Psychiatric: He has a normal mood and affect. His behavior is normal.   Vitals reviewed.    Results Review:  I have reviewed the labs, radiology results, and diagnostic studies.    Laboratory Data:   Lab Results (last 24 hours)     Procedure Component Value Units Date/Time    Vancomycin, Trough [[id number]]  (Normal) Collected:  03/31/17 1704    Specimen:  Blood Updated:  03/31/17 1814     Vancomycin Trough 10.87 mcg/mL     Tissue Exam [[id number]] Collected:  03/29/17 1118    Specimen:  Tissue from Toe, Right Updated:  03/31/17 1940     Case Report --     Surgical Pathology Report                         Case: [id number]                                  Authorizing Provider:  Narciso Maza DPM     Collected:           03/29/2017 11:18 AM          Ordering Location:     [facility]             Received:            03/29/2017 01:36 PM                                 [place]                                                              Pathologist:           Dilip Maharaj MD                                                            Specimen:    Toe, Right, right big toe                                                                   Final Diagnosis --     RIGHT GREAT TOE, TRANSMETATARSAL AMPUTATION:  OSTEOMYELITIS OF THE DISTAL METATARSAL.       Gross Description --     The specimen is labeled \"right big toe\".  The toe, including both phalanges, " measures 6.0 x 3.5 x 3.5 cm.  Also present in the container is a segment of long bone, presumably the distal first metatarsal, measuring 5.0 x 2.2 x 2.3 cm.  This is disrupted near the distal tip.  Also present in the container is a segment of tendon measuring 4.0 cm in length and 0.8 x 0.4 cm in cross sectional area.  Finally, an irregular fragment of soft tissue measures 3.0 x 3.5 x 1.5 cm.  Sections of soft tissue are submitted in block 1A, and a section of distal metatarsal is submitted for decalcification.       Embedded Images --    POC Glucose Fingerstick [[id number]]  (Normal) Collected:  03/31/17 2105    Specimen:  Blood Updated:  04/01/17 0339     Glucose 104 mg/dL       Meter: [id number]: [id number] [facility]       CBC & Differential [[id number]] Collected:  04/01/17 0610    Specimen:  Blood Updated:  04/01/17 0630    Narrative:       The following orders were created for panel order CBC & Differential.  Procedure                               Abnormality         Status                     ---------                               -----------         ------                     CBC Auto Differential[[id number]]         Abnormal            Final result                 Please view results for these tests on the individual orders.    CBC Auto Differential [[id number]]  (Abnormal) Collected:  04/01/17 0610    Specimen:  Blood Updated:  04/01/17 0630     WBC 9.58 10*3/mm3      RBC 3.64 (L) 10*6/mm3      Hemoglobin 10.5 (L) g/dL      Hematocrit 31.5 (L) %      MCV 86.5 fL      MCH 28.8 pg      MCHC 33.3 g/dL      RDW 16.9 (H) %      RDW-SD 54.0 (H) fl      MPV 8.7 fL      Platelets 312 10*3/mm3      Neutrophil % 57.7 %      Lymphocyte % 21.9 %      Monocyte % 14.7 (H) %      Eosinophil % 5.1 %      Basophil % 0.3 %      Immature Grans % 0.3 %      Neutrophils, Absolute 5.52 10*3/mm3      Lymphocytes, Absolute 2.10 10*3/mm3      Monocytes, Absolute 1.41 (H) 10*3/mm3      Eosinophils, Absolute 0.49 10*3/mm3       Basophils, Absolute 0.03 10*3/mm3      Immature Grans, Absolute 0.03 (H) 10*3/mm3     Comprehensive Metabolic Panel [[id number]]  (Abnormal) Collected:  04/01/17 0610    Specimen:  Blood Updated:  04/01/17 0646     Glucose 99 mg/dL      BUN 19 mg/dL      Creatinine 1.28 mg/dL      Sodium 138 mmol/L      Potassium 3.9 mmol/L      Chloride 107 mmol/L      CO2 24.0 mmol/L      Calcium 8.0 (L) mg/dL      Total Protein 6.0 (L) g/dL      Albumin 2.50 (L) g/dL      ALT (SGPT) 38 U/L      AST (SGOT) 48 U/L      Alkaline Phosphatase 168 (H) U/L      Total Bilirubin 0.5 mg/dL      eGFR Non African Amer 56 (L) mL/min/1.73      Globulin 3.5 gm/dL      A/G Ratio 0.7 (L) g/dL      BUN/Creatinine Ratio 14.8     Anion Gap 7.0 mmol/L     Magnesium [[id number]]  (Normal) Collected:  04/01/17 0610    Specimen:  Blood Updated:  04/01/17 0646     Magnesium 1.7 mg/dL     POC Glucose Fingerstick [[id number]]  (Normal) Collected:  04/01/17 1709    Specimen:  Blood Updated:  04/01/17 1722     Glucose 103 mg/dL       Meter: [id number]: [id number] JERRY VELMA       Blood Culture [[id number]]  (Normal) Collected:  03/27/17 1718    Specimen:  Blood from Hand, Right Updated:  04/01/17 1801     Blood Culture No growth at 5 days    Blood Culture [[id number]]  (Normal) Collected:  03/27/17 1707    Specimen:  Blood from Arm, Right Updated:  04/01/17 1801     Blood Culture No growth at 5 days          Culture Data:   No results found for: BLOODCX  No results found for: URINECX  No results found for: RESPCX  No results found for: WOUNDCX  No results found for: STOOLCX  No components found for: BODYFLD    Radiology Data:   Imaging Results (last 24 hours)     Procedure Component Value Units Date/Time    CT Lower Extremity Right Without Contrast [[id number]] Collected:  03/27/17 2124     Updated:  03/27/17 2136    Narrative:         [facility]    Patient Name: LUIS ARIAS    ORDERING: JUANITA SEGURA    ATTENDING: WAYNE RODRIGUEZ      REFERRING: JUANITA SEGURA  -----------------------    PROCEDURE: CT right ankle and foot without contrast on 3/27/2017    CLINICAL INDICATION:  Right foot ulcer, osteomyelitis    TECHNIQUE: Multiple axial images are obtained throughout the  right ankle and foot without the administration of contrast.  Sagittal and coronal reformatted images are also performed and  reviewed. This study was performed with techniques to keep  radiation doses as low as reasonably achievable, (ALARA).   Total DLP is 206.7 mGy*cm.    COMPARISON: Right foot x-ray from 2/13/2017     FINDINGS: There is diffuse osteopenia. There is large soft tissue  ulceration adjacent to the head of the first metatarsal and first  MTP joint along its medial aspect. There is associated adjacent  air within the soft tissues and extending into the first MTP  joint. Extensive soft tissue changes are noted around this site  consistent with infection and cellulitis. There is bony erosion  of the head and neck of the first metatarsal especially along its  medial plantar aspect consistent with osteomyelitis. There is  also some bony erosion with adjacent air in the base of the first  proximal phalanx consistent with osteomyelitis at this location  as well. There is subluxation of the first MTP joint with  inferior positioning of the first metatarsal. The first  metatarsal head inferiorly appears to be exposed bone at the site  of osteomyelitis but please correlate clinically. Vascular  calcifications are noted. No other definite CT site of  osteomyelitis is noted. Osteomyelitis involving the first  metatarsal extends proximally from the head of the first  metatarsal at least 2.3 cm into the first metatarsal diaphysis.  No definite fluid collection to suggest abscess is noted.      Impression:       CONCLUSION: Open ulceration adjacent to the first MTP joint with  evidence of infection and osteomyelitis involving the distal  first metatarsal and the base of the  first proximal phalanx.    Electronically signed by:  Zach Kyle  3/27/2017 9:35 PM  CDT Workstation: [id number]          I have reviewed the patient current medications.   Scheduled Meds:    aspirin 81 mg Oral Daily   atorvastatin 40 mg Oral Nightly   citalopram 40 mg Oral Daily   insulin aspart 0-14 Units Subcutaneous 4x Daily AC & at Bedtime   insulin detemir 20 Units Subcutaneous Nightly   piperacillin-tazobactam 2.25 g Intravenous Q6H     Continuous Infusions:    Pharmacy Consult     sodium chloride 75 mL/hr Last Rate: 75 mL/hr (04/01/17 0515)     PRN Meds:.•  albuterol  •  bupivacaine (PF)  •  dextrose  •  dextrose  •  docusate sodium  •  glucagon (human recombinant)  •  hydrALAZINE  •  influenza vaccine  •  Morphine  •  nystatin  •  nystatin  •  ondansetron  •  Pharmacy Consult  •  pneumococcal polysaccharide 23-valent  •  sodium chloride      Assessment/Plan     71-year-old admitted for    1. Right foot ulcer- chronic nonhealing probably due to combination of diabetes and peripheral vascular disease.  Patient was started on vancomycin and Zosyn.  Wound care was consulted.  Podiatrist was also consulted- patient underwent are partial first ray amputation today.  Wound culture showed growth of Proteus mirabilis sensitive to Zosyn.  Vancomycin was discontinued     2. Osteomyelitis- CT scan shows ulceration adjacent to the first MTP joint with evidence of infection and osteomyelitis involving the distal first metatarsal and base of the proximal phalanx. Patient underwent  partial first ray amputation.    3. History of CVA- stable, no new deficits     4. DM II, insulin dependent- blood sugars fairly controlled, continue the patient on insulin sliding scale and ADA diet    5.  DVT prophylaxis patient was on heparin, This has to be stopped due to nose bleed. Can not use SCD and MIESHA due significant PVD.    6.  Chronic kidney disease creatinine appears to be at its baseline.    Discharge Planning: I expect  patient to be discharged to nursing home in 2 days.    Rima Keating MD   04/01/17   6:13 PM

## 2017-04-02 LAB
GLUCOSE BLDC GLUCOMTR-MCNC: 107 MG/DL (ref 70–130)
GLUCOSE BLDC GLUCOMTR-MCNC: 107 MG/DL (ref 70–130)
GLUCOSE BLDC GLUCOMTR-MCNC: 87 MG/DL (ref 70–130)
GLUCOSE BLDC GLUCOMTR-MCNC: 90 MG/DL (ref 70–130)
GLUCOSE BLDC GLUCOMTR-MCNC: 94 MG/DL (ref 70–130)
GLUCOSE BLDC GLUCOMTR-MCNC: 94 MG/DL (ref 70–130)

## 2017-04-02 PROCEDURE — 25010000002 PIPERACILLIN SOD-TAZOBACTAM PER 1 G: Performed by: PHYSICIAN ASSISTANT

## 2017-04-02 PROCEDURE — 82962 GLUCOSE BLOOD TEST: CPT

## 2017-04-02 RX ADMIN — TAZOBACTAM SODIUM AND PIPERACILLIN SODIUM 2.25 G: 250; 2 INJECTION, SOLUTION INTRAVENOUS at 05:39

## 2017-04-02 RX ADMIN — TAZOBACTAM SODIUM AND PIPERACILLIN SODIUM 2.25 G: 250; 2 INJECTION, SOLUTION INTRAVENOUS at 23:17

## 2017-04-02 RX ADMIN — ATORVASTATIN CALCIUM 40 MG: 40 TABLET, FILM COATED ORAL at 21:16

## 2017-04-02 RX ADMIN — TAZOBACTAM SODIUM AND PIPERACILLIN SODIUM 2.25 G: 250; 2 INJECTION, SOLUTION INTRAVENOUS at 11:24

## 2017-04-02 RX ADMIN — TAZOBACTAM SODIUM AND PIPERACILLIN SODIUM 2.25 G: 250; 2 INJECTION, SOLUTION INTRAVENOUS at 17:17

## 2017-04-02 RX ADMIN — SODIUM CHLORIDE 75 ML/HR: 900 INJECTION, SOLUTION INTRAVENOUS at 00:27

## 2017-04-02 RX ADMIN — TAZOBACTAM SODIUM AND PIPERACILLIN SODIUM 2.25 G: 250; 2 INJECTION, SOLUTION INTRAVENOUS at 00:28

## 2017-04-02 RX ADMIN — ASPIRIN 81 MG: 81 TABLET, COATED ORAL at 10:31

## 2017-04-02 RX ADMIN — SODIUM CHLORIDE 75 ML/HR: 900 INJECTION, SOLUTION INTRAVENOUS at 17:17

## 2017-04-02 RX ADMIN — CITALOPRAM HYDROBROMIDE 40 MG: 40 TABLET ORAL at 10:31

## 2017-04-02 NOTE — PROGRESS NOTES
[facility] Medicine Services  INPATIENT PROGRESS NOTE    Length of Stay: 6  Date of Admission: 3/27/2017  Primary Care Physician: Amirah Archuleta MD    Subjective   Chief Complaint:  Worsening right foot ulcer    HPI: 71-year-old  male presented to the ER with complaints of worsening ulcer of the right foot.  Patient has had history of chronic nonhealing wound on the right foot for which she is seeing podiatrist.  Patient was also being followed by home health.  The home health nurse noted that the wound was progressively getting worse and extending up the bone.  Patient was admitted to the inpatient and started on vancomycin and Zosyn.  Blood cultures and wound cultures were obtained.  Consult from the podiatrist (Dr. Maza) obtained-patient underwent partial first ray amputation today.    Today patient claims to be doing fairly well he denies any pain in the foot.  Denies any fever or chills.  No fresh complains reported this morning.  PT/OT consulted  Awaiting nursing home placement    Review of Systems   Constitutional: Negative for chills and fever.   HENT: Negative for congestion and trouble swallowing.    Respiratory: Negative for cough and shortness of breath.    Cardiovascular: Negative for chest pain and leg swelling.   Gastrointestinal: Negative for abdominal pain, diarrhea and nausea.   Genitourinary: Negative for decreased urine volume and dysuria.   Musculoskeletal: Negative for back pain, gait problem and neck stiffness.   Neurological: Negative for syncope, speech difficulty and headaches.   Psychiatric/Behavioral: Negative for behavioral problems and confusion.     All pertinent negatives and positives are as above. All other systems have been reviewed and are negative unless otherwise stated.     Objective    Temp:  [96.2 °F (35.7 °C)-98 °F (36.7 °C)] 97 °F (36.1 °C)  Heart Rate:  [60-82] 60  Resp:  [16-18] 18  BP: (124-142)/(63-82)  142/63  Physical Exam   Constitutional: He appears well-developed and well-nourished.   HENT:   Head: Normocephalic and atraumatic.   Eyes: EOM are normal. Pupils are equal, round, and reactive to light.   Cardiovascular: Normal rate, regular rhythm and intact distal pulses.    Pulmonary/Chest: Effort normal and breath sounds normal.   Abdominal: Soft. Bowel sounds are normal.   Musculoskeletal: Normal range of motion.   Neurological: He is alert. He has normal reflexes.   Skin: Skin is warm.   Right foot ulcer- First MTP joint with evidence of infection    Psychiatric: He has a normal mood and affect. His behavior is normal.   Vitals reviewed.    Results Review:  I have reviewed the labs, radiology results, and diagnostic studies.    Laboratory Data:   Lab Results (last 24 hours)     Procedure Component Value Units Date/Time    POC Glucose Fingerstick [[id number]]  (Normal) Collected:  04/01/17 1709    Specimen:  Blood Updated:  04/01/17 1722     Glucose 103 mg/dL       Meter: [id number]: [id number] JERRY VELMA       Blood Culture [[id number]]  (Normal) Collected:  03/27/17 1718    Specimen:  Blood from Hand, Right Updated:  04/01/17 1801     Blood Culture No growth at 5 days    Blood Culture [[id number]]  (Normal) Collected:  03/27/17 1707    Specimen:  Blood from Arm, Right Updated:  04/01/17 1801     Blood Culture No growth at 5 days    POC Glucose Fingerstick [[id number]]  (Normal) Collected:  04/01/17 2108    Specimen:  Blood Updated:  04/02/17 0123     Glucose 107 mg/dL     POC Glucose Fingerstick [[id number]]  (Normal) Collected:  04/02/17 1032    Specimen:  Blood Updated:  04/02/17 1216     Glucose 87 mg/dL       Meter: [id number]: [id number] JERRY VELMA             Culture Data:   No results found for: BLOODCX  No results found for: URINECX  No results found for: RESPCX  No results found for: WOUNDCX  No results found for: STOOLCX  No components found for: BODYFLD    Radiology Data:   Imaging Results  (last 24 hours)     Procedure Component Value Units Date/Time    CT Lower Extremity Right Without Contrast [[id number]] Collected:  03/27/17 2124     Updated:  03/27/17 2136    Narrative:         [facility]    Patient Name: LUIS ARIAS    ORDERING: JUANITA SEGURA    ATTENDING: WAYNE RODRIGUEZ     REFERRING: JUANITA SEGURA  -----------------------    PROCEDURE: CT right ankle and foot without contrast on 3/27/2017    CLINICAL INDICATION:  Right foot ulcer, osteomyelitis    TECHNIQUE: Multiple axial images are obtained throughout the  right ankle and foot without the administration of contrast.  Sagittal and coronal reformatted images are also performed and  reviewed. This study was performed with techniques to keep  radiation doses as low as reasonably achievable, (ALARA).   Total DLP is 206.7 mGy*cm.    COMPARISON: Right foot x-ray from 2/13/2017     FINDINGS: There is diffuse osteopenia. There is large soft tissue  ulceration adjacent to the head of the first metatarsal and first  MTP joint along its medial aspect. There is associated adjacent  air within the soft tissues and extending into the first MTP  joint. Extensive soft tissue changes are noted around this site  consistent with infection and cellulitis. There is bony erosion  of the head and neck of the first metatarsal especially along its  medial plantar aspect consistent with osteomyelitis. There is  also some bony erosion with adjacent air in the base of the first  proximal phalanx consistent with osteomyelitis at this location  as well. There is subluxation of the first MTP joint with  inferior positioning of the first metatarsal. The first  metatarsal head inferiorly appears to be exposed bone at the site  of osteomyelitis but please correlate clinically. Vascular  calcifications are noted. No other definite CT site of  osteomyelitis is noted. Osteomyelitis involving the first  metatarsal extends proximally from the head of the  first  metatarsal at least 2.3 cm into the first metatarsal diaphysis.  No definite fluid collection to suggest abscess is noted.      Impression:       CONCLUSION: Open ulceration adjacent to the first MTP joint with  evidence of infection and osteomyelitis involving the distal  first metatarsal and the base of the first proximal phalanx.    Electronically signed by:  Zach Kyle  3/27/2017 9:35 PM  CDT [facility]          I have reviewed the patient current medications.   Scheduled Meds:    aspirin 81 mg Oral Daily   atorvastatin 40 mg Oral Nightly   citalopram 40 mg Oral Daily   insulin aspart 0-14 Units Subcutaneous 4x Daily AC & at Bedtime   insulin detemir 20 Units Subcutaneous Nightly   piperacillin-tazobactam 2.25 g Intravenous Q6H     Continuous Infusions:    Pharmacy Consult     sodium chloride 75 mL/hr Last Rate: 75 mL/hr (04/02/17 0027)     PRN Meds:.•  albuterol  •  bupivacaine (PF)  •  dextrose  •  dextrose  •  docusate sodium  •  glucagon (human recombinant)  •  hydrALAZINE  •  influenza vaccine  •  Morphine  •  nystatin  •  nystatin  •  ondansetron  •  Pharmacy Consult  •  pneumococcal polysaccharide 23-valent  •  sodium chloride      Assessment/Plan     71-year-old admitted for    1. Right foot ulcer- chronic nonhealing probably due to combination of diabetes and peripheral vascular disease.  Patient was started on vancomycin and Zosyn.  Wound care was consulted.  Podiatrist was also consulted- patient underwent are partial first ray amputation today.  Wound culture showed growth of Proteus mirabilis sensitive to Zosyn (Day 7).   Vancomycin was discontinued    2. Osteomyelitis- CT scan shows ulceration adjacent to the first MTP joint with evidence of infection and osteomyelitis involving the distal first metatarsal and base of the proximal phalanx. Patient underwent  partial first ray amputation.    3. History of CVA- stable, no new deficits     4. DM II, insulin dependent-  blood sugars fairly controlled, continue the patient on insulin sliding scale and ADA diet    5.  DVT prophylaxis patient was on heparin, This has to be stopped due to nose bleed. Can not use SCD and MIESHA due significant PVD.    6.  Chronic kidney disease creatinine appears to be at its baseline.    Awaiting for NH placement for rehab,  Can D/C IV antibiotics after 3 more day (Total 10 days)    Discharge Planning: I expect patient to be discharged to nursing home in 2 days.    Rima Keating MD   04/02/17   3:11 PM

## 2017-04-02 NOTE — PLAN OF CARE
Problem: Patient Care Overview (Adult)  Goal: Plan of Care Review  Outcome: Ongoing (interventions implemented as appropriate)    04/02/17 1734   Outcome Evaluation   Outcome Summary/Follow up Plan Pt rested well, VS stable, no c/o pain       Goal: Adult Individualization and Mutuality  Outcome: Ongoing (interventions implemented as appropriate)  Goal: Discharge Needs Assessment  Outcome: Ongoing (interventions implemented as appropriate)    Problem: Pressure Ulcer (Adult)  Goal: Signs and Symptoms of Listed Potential Problems Will be Absent or Manageable (Pressure Ulcer)  Outcome: Ongoing (interventions implemented as appropriate)    Problem: Fall Risk (Adult)  Goal: Absence of Falls  Outcome: Ongoing (interventions implemented as appropriate)

## 2017-04-02 NOTE — PLAN OF CARE
Problem: Patient Care Overview (Adult)  Goal: Plan of Care Review  Outcome: Ongoing (interventions implemented as appropriate)    04/02/17 4096   Patient Care Overview   Progress no change   Coping/Psychosocial Response Interventions   Plan Of Care Reviewed With patient       Goal: Adult Individualization and Mutuality  Outcome: Ongoing (interventions implemented as appropriate)  Goal: Discharge Needs Assessment  Outcome: Ongoing (interventions implemented as appropriate)    Problem: Pressure Ulcer (Adult)  Goal: Signs and Symptoms of Listed Potential Problems Will be Absent or Manageable (Pressure Ulcer)  Outcome: Ongoing (interventions implemented as appropriate)    Problem: Fall Risk (Adult)  Goal: Absence of Falls  Outcome: Ongoing (interventions implemented as appropriate)

## 2017-04-03 LAB
ALBUMIN SERPL-MCNC: 2.7 G/DL (ref 3.4–4.8)
ALBUMIN/GLOB SERPL: 0.8 G/DL (ref 1.1–1.8)
ALP SERPL-CCNC: 169 U/L (ref 38–126)
ALT SERPL W P-5'-P-CCNC: 41 U/L (ref 21–72)
ANION GAP SERPL CALCULATED.3IONS-SCNC: 9 MMOL/L (ref 5–15)
AST SERPL-CCNC: 60 U/L (ref 17–59)
BASOPHILS # BLD AUTO: 0.04 10*3/MM3 (ref 0–0.2)
BASOPHILS NFR BLD AUTO: 0.5 % (ref 0–2)
BILIRUB SERPL-MCNC: 0.5 MG/DL (ref 0.2–1.3)
BUN BLD-MCNC: 20 MG/DL (ref 7–21)
BUN/CREAT SERPL: 15.7 (ref 7–25)
CALCIUM SPEC-SCNC: 8.3 MG/DL (ref 8.4–10.2)
CHLORIDE SERPL-SCNC: 103 MMOL/L (ref 95–110)
CO2 SERPL-SCNC: 27 MMOL/L (ref 22–31)
CREAT BLD-MCNC: 1.27 MG/DL (ref 0.7–1.3)
DEPRECATED RDW RBC AUTO: 53.9 FL (ref 35.1–43.9)
EOSINOPHIL # BLD AUTO: 0.41 10*3/MM3 (ref 0–0.7)
EOSINOPHIL NFR BLD AUTO: 4.7 % (ref 0–7)
ERYTHROCYTE [DISTWIDTH] IN BLOOD BY AUTOMATED COUNT: 16.9 % (ref 11.5–14.5)
GFR SERPL CREATININE-BSD FRML MDRD: 56 ML/MIN/1.73 (ref 60–113)
GLOBULIN UR ELPH-MCNC: 3.3 GM/DL (ref 2.3–3.5)
GLUCOSE BLD-MCNC: 101 MG/DL (ref 60–100)
GLUCOSE BLDC GLUCOMTR-MCNC: 117 MG/DL (ref 70–130)
GLUCOSE BLDC GLUCOMTR-MCNC: 162 MG/DL (ref 70–130)
GLUCOSE BLDC GLUCOMTR-MCNC: 83 MG/DL (ref 70–130)
GLUCOSE BLDC GLUCOMTR-MCNC: 95 MG/DL (ref 70–130)
HCT VFR BLD AUTO: 32.9 % (ref 39–49)
HGB BLD-MCNC: 10.6 G/DL (ref 13.7–17.3)
IMM GRANULOCYTES # BLD: 0.02 10*3/MM3 (ref 0–0.02)
IMM GRANULOCYTES NFR BLD: 0.2 % (ref 0–0.5)
LYMPHOCYTES # BLD AUTO: 1.82 10*3/MM3 (ref 0.6–4.2)
LYMPHOCYTES NFR BLD AUTO: 20.8 % (ref 10–50)
MAGNESIUM SERPL-MCNC: 1.8 MG/DL (ref 1.6–2.3)
MCH RBC QN AUTO: 28.2 PG (ref 26.5–34)
MCHC RBC AUTO-ENTMCNC: 32.2 G/DL (ref 31.5–36.3)
MCV RBC AUTO: 87.5 FL (ref 80–98)
MONOCYTES # BLD AUTO: 1.3 10*3/MM3 (ref 0–0.9)
MONOCYTES NFR BLD AUTO: 14.9 % (ref 0–12)
NEUTROPHILS # BLD AUTO: 5.14 10*3/MM3 (ref 2–8.6)
NEUTROPHILS NFR BLD AUTO: 58.9 % (ref 37–80)
PLATELET # BLD AUTO: 326 10*3/MM3 (ref 150–450)
PMV BLD AUTO: 8.9 FL (ref 8–12)
POTASSIUM BLD-SCNC: 4 MMOL/L (ref 3.5–5.1)
PROT SERPL-MCNC: 6 G/DL (ref 6.3–8.6)
RBC # BLD AUTO: 3.76 10*6/MM3 (ref 4.37–5.74)
SODIUM BLD-SCNC: 139 MMOL/L (ref 137–145)
WBC NRBC COR # BLD: 8.73 10*3/MM3 (ref 3.2–9.8)

## 2017-04-03 PROCEDURE — 63710000001 INSULIN ASPART PER 5 UNITS: Performed by: INTERNAL MEDICINE

## 2017-04-03 PROCEDURE — 25010000002 PIPERACILLIN SOD-TAZOBACTAM PER 1 G: Performed by: PHYSICIAN ASSISTANT

## 2017-04-03 PROCEDURE — 82962 GLUCOSE BLOOD TEST: CPT

## 2017-04-03 PROCEDURE — 63710000001 INSULIN DETEMIR PER 5 UNITS: Performed by: PHYSICIAN ASSISTANT

## 2017-04-03 PROCEDURE — 80053 COMPREHEN METABOLIC PANEL: CPT | Performed by: FAMILY MEDICINE

## 2017-04-03 PROCEDURE — 85025 COMPLETE CBC W/AUTO DIFF WBC: CPT | Performed by: FAMILY MEDICINE

## 2017-04-03 PROCEDURE — 83735 ASSAY OF MAGNESIUM: CPT | Performed by: FAMILY MEDICINE

## 2017-04-03 PROCEDURE — 99024 POSTOP FOLLOW-UP VISIT: CPT | Performed by: PODIATRIST

## 2017-04-03 RX ADMIN — INSULIN ASPART 3 UNITS: 100 INJECTION, SOLUTION INTRAVENOUS; SUBCUTANEOUS at 16:58

## 2017-04-03 RX ADMIN — TAZOBACTAM SODIUM AND PIPERACILLIN SODIUM 2.25 G: 250; 2 INJECTION, SOLUTION INTRAVENOUS at 11:01

## 2017-04-03 RX ADMIN — TAZOBACTAM SODIUM AND PIPERACILLIN SODIUM 2.25 G: 250; 2 INJECTION, SOLUTION INTRAVENOUS at 17:00

## 2017-04-03 RX ADMIN — TAZOBACTAM SODIUM AND PIPERACILLIN SODIUM 2.25 G: 250; 2 INJECTION, SOLUTION INTRAVENOUS at 23:17

## 2017-04-03 RX ADMIN — CITALOPRAM HYDROBROMIDE 40 MG: 40 TABLET ORAL at 08:07

## 2017-04-03 RX ADMIN — TAZOBACTAM SODIUM AND PIPERACILLIN SODIUM 2.25 G: 250; 2 INJECTION, SOLUTION INTRAVENOUS at 06:23

## 2017-04-03 RX ADMIN — INSULIN DETEMIR 20 UNITS: 100 INJECTION, SOLUTION SUBCUTANEOUS at 20:53

## 2017-04-03 RX ADMIN — SODIUM CHLORIDE 75 ML/HR: 900 INJECTION, SOLUTION INTRAVENOUS at 08:07

## 2017-04-03 RX ADMIN — ASPIRIN 81 MG: 81 TABLET, COATED ORAL at 08:07

## 2017-04-03 RX ADMIN — ATORVASTATIN CALCIUM 40 MG: 40 TABLET, FILM COATED ORAL at 20:52

## 2017-04-03 RX ADMIN — SODIUM CHLORIDE 75 ML/HR: 900 INJECTION, SOLUTION INTRAVENOUS at 23:17

## 2017-04-03 NOTE — PROGRESS NOTES
Podiatry/Surgery Progress Note    S: Doing well. No pain to right foot. Denies N/V/F/C/SOB.    O:  Vitals:    04/03/17 0450   BP: 130/61   Pulse: 75   Resp: 20   Temp: 96.1 °F (35.6 °C)   SpO2: 93%        Lower Extremity Exam:  Vascular: DP/PT pulses non-palpable   Negative hair growth.   1+ Edema  Toes cool, CFT delayed  Neuro: Protective sensation absent, b/l.  DTRs hyperreflexive  Integument: No lesions.  Right foot incision coapted, sutures intact, no sign of local injection.  Mild surrounding erythema  Web spaces c/d/i  Musculoskeletal: LE muscle strength 3-4/5.   Flexure contracture of hip, knee on right     WBC   Date Value Ref Range Status   04/01/2017 9.58 3.20 - 9.80 10*3/mm3 Final     RBC   Date Value Ref Range Status   04/01/2017 3.64 (L) 4.37 - 5.74 10*6/mm3 Final     Hemoglobin   Date Value Ref Range Status   04/01/2017 10.5 (L) 13.7 - 17.3 g/dL Final     Hematocrit   Date Value Ref Range Status   04/01/2017 31.5 (L) 39.0 - 49.0 % Final     MCV   Date Value Ref Range Status   04/01/2017 86.5 80.0 - 98.0 fL Final     MCH   Date Value Ref Range Status   04/01/2017 28.8 26.5 - 34.0 pg Final     MCHC   Date Value Ref Range Status   04/01/2017 33.3 31.5 - 36.3 g/dL Final     RDW   Date Value Ref Range Status   04/01/2017 16.9 (H) 11.5 - 14.5 % Final     RDW-SD   Date Value Ref Range Status   04/01/2017 54.0 (H) 35.1 - 43.9 fl Final     MPV   Date Value Ref Range Status   04/01/2017 8.7 8.0 - 12.0 fL Final     Platelets   Date Value Ref Range Status   04/01/2017 312 150 - 450 10*3/mm3 Final     Neutrophil %   Date Value Ref Range Status   04/01/2017 57.7 37.0 - 80.0 % Final     Lymphocyte %   Date Value Ref Range Status   04/01/2017 21.9 10.0 - 50.0 % Final     Monocyte %   Date Value Ref Range Status   04/01/2017 14.7 (H) 0.0 - 12.0 % Final     Eosinophil %   Date Value Ref Range Status   04/01/2017 5.1 0.0 - 7.0 % Final     Basophil %   Date Value Ref Range Status   04/01/2017 0.3 0.0 - 2.0 % Final      Immature Grans %   Date Value Ref Range Status   04/01/2017 0.3 0.0 - 0.5 % Final     Neutrophils, Absolute   Date Value Ref Range Status   04/01/2017 5.52 2.00 - 8.60 10*3/mm3 Final     Lymphocytes, Absolute   Date Value Ref Range Status   04/01/2017 2.10 0.60 - 4.20 10*3/mm3 Final     Monocytes, Absolute   Date Value Ref Range Status   04/01/2017 1.41 (H) 0.00 - 0.90 10*3/mm3 Final     Eosinophils, Absolute   Date Value Ref Range Status   04/01/2017 0.49 0.00 - 0.70 10*3/mm3 Final     Basophils, Absolute   Date Value Ref Range Status   04/01/2017 0.03 0.00 - 0.20 10*3/mm3 Final     Immature Grans, Absolute   Date Value Ref Range Status   04/01/2017 0.03 (H) 0.00 - 0.02 10*3/mm3 Final       A/P:  1. 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 POD 5  2. Deep tissue injury, right foot  3. Type 2 Diabetes  4. PAD  5. History of CVA with LE contractures.    Dressing changed at bedside. Packing pulled. Doing well overall, no residual signs of infection. Continue offloading of site on pillows. OK for d/c from my standpoint following completion of antibiotic course. F/u 1 week following d/c as outpatient. May leave dressing c/d/i, reinforce as needed.          This document has been electronically signed by Narcios Maza DPM on April 3, 2017 7:24 AM

## 2017-04-03 NOTE — CONSULTS
"Adult Nutrition  Assessment    Patient Name:  Josiah Del Castillo  YOB: 1945  MRN: [id number]  Admit Date:  3/27/2017    Assessment Date:  4/3/2017          Reason for Assessment       04/03/17 1850    Reason for Assessment    Reason For Assessment/Visit follow up protocol                Anthropometrics       04/03/17 1850    Anthropometrics    Height 162.6 cm (64.02\")    Weight 99.8 kg (220 lb 0.3 oz)    Ideal Body Weight (IBW)    Ideal Body Weight (IBW), Male (kg) 59.76    % Ideal Body Weight 167.35    Body Mass Index (BMI)    BMI (kg/m2) 37.83    BMI Grade 35 - 39.9 - obesity - grade II            Labs/Tests/Procedures/Meds       04/03/17 1851    Labs/Tests/Procedures/Meds    Labs/Tests Review Alb;Glucose;Hgb Hct    Medication Review Insulin;Reviewed, pertinent            Physical Findings       04/03/17 1852    Physical Appearance    Skin other (see comments)   stage 2 pressure ulcer left lateral ankle, wound bilateral medial groin, abrasion bilateral medial scrotum, wound left lateral hip, wound right medial foot, incision right foot              Nutrition Prescription Ordered       04/03/17 1852    Nutrition Prescription PO    Current PO Diet Regular    Supplement Nepro    Supplement Frequency 3 times a day    Common Modifiers Cardiac;Consistent Carbohydrate;Renal            Evaluation of Received Nutrient/Fluid Intake       04/03/17 1853    PO Evaluation    Number of Meals 10    % PO Intake --   50% - 5x, 25% - x, 0% - 4x            Comments:  Pt indicates his appetite varies.  Likes the Nepro he receives. Voiced no new food preferences.  Intake 50% - 5x, 25% - 1x, 0% - 4x.  Blood glucose is elevated on occasion.  Levemir insulin, sliding scale novolog prescribed.  May need to consider an appetite stimulant if intake doesn't improve.          Electronically signed by:  Lesa Thrasher RD  04/03/17 6:55 PM  "

## 2017-04-03 NOTE — DISCHARGE PLACEMENT REQUEST
"Josiah Del Castillo (71 y.o. Male)     Date of Birth Social Security Number Address Home Phone MRN    [id number]  [id number] [facility] [phone] [id number]    [place] Marital Status          Christianity        Admission Date Admission Type Admitting Provider Attending Provider Department, Room/Bed    3/27/17 Urgent Saroj Preston MD Echendu, Anthony W, MD [address], [facility]/1    Discharge Date Discharge Disposition Discharge Destination                      Attending Provider: Saroj Preston MD     Allergies:  No Known Allergies    Isolation:  None   Infection:  None   Code Status:  FULL    Ht:  64.02\" (162.6 cm)   Wt:  220 lb 0.3 oz (99.8 kg)    Admission Cmt:  None   Principal Problem:  None                Active Insurance as of 3/27/2017     Primary Coverage     Payor Plan Insurance Group Employer/Plan Group    MEDICARE MEDICARE A & B      Payor Plan Address Payor Plan Phone Number Effective From Effective To    [address] [phone] 12/1/1982     [place] [address]       Subscriber Name Subscriber Birth Date Member ID       JOSIAH DEL CASTILLO 1945 [id number]           Secondary Coverage     Payor Plan Insurance Group Employer/Plan Group    [place]      Payor Plan Address Payor Plan Phone Number Effective From Effective To    [address] [phone] 2/2/2017     [place] [address]       Subscriber Name Subscriber Birth Date Member ID       JOSIAH DEL CASTILLO 1945 [id number]                 Emergency Contacts      (Rel.) Home Phone Work Phone Mobile Phone    AbundioDanisha (Spouse) -- -- [phone]               Nursing Assessments (last 72 hours)      Adult PCS Body System       03/31/17 1244 03/31/17 1300 03/31/17 1500 03/31/17 1700 03/31/17 2030    Pain/Comfort/Sleep    Presence Of Pain  non-verbal indicator of pain/discomfort not present denies pain/discomfort  denies pain/discomfort    " Preferred Pain Scale  FACES (Omer-Elder FACES Pain Rating Scale) number (Numeric Rating Pain Scale)  number (Numeric Rating Pain Scale)    Pain Rating (0-10): Rest   0      FACES Pain Rating: Rest  0-->no hurt 0-->no hurt      Sleep/Rest/Relaxation  appears asleep   awake    Coping/Psychosocial    Observed Emotional State     calm    Plan Of Care Reviewed With     patient    Family/Support System    Involvement in Care     not present at bedside    HEENT    HEENT WDL      WDL except    Additional Documentation     Teeth WDL (Group)    Teeth WDL    Teeth WDL      WDL except   decayed    Cognitive    Cognitive/Neuro/Behavioral WDL      WDL except;orientation    Level of Consciousness     Alert    Orientation     disoriented to;time    [place] Coma Scale    Best Eye Response     4-->(E4) spontaneous    Best Motor Response     6-->(M6) obeys commands    Best Verbal Response     5-->(V5) oriented   forgetful at times    Yolanda Coma Scale Score     15    Respiratory    Respiratory WDL      WDL except;breath sounds    Cough And Deep Breathing     done with encouragement    Breath Sounds    Breath Sounds     All Fields    All Fields Breath Sounds     diminished    Oxygen Therapy    O2 Device     room air    Cardiac    Cardiac WDL     WDL    Peripheral Neurovascular    Peripheral Neurovascular WDL     WDL    Venous Thromboembolism Prevent/Manage     anticoagulant therapy maintained    Neurovascular Assessment    All Extremities     LLE;RLE    LLE Temperature     cool    LLE Color     pale    LLE Sensation     no tingling;no numbness    RLE Temperature     cool    RLE Color     pale    RLE Sensation     no tingling;no numbness    Dorsalis Pedis Pulse    Left Dorsalis Pedis Pulse     1+ (weak)    Right Dorsalis Pedis Pulse     unable to assess   ace wrap in place    Gastrointestinal    GI WDL     WDL    Last Bowel Movement     04/30/17    Genitourinary    Genitourinary WDL     WDL    Skin    Skin WDL      WDL except    Skin  Temperature     warm    Skin Moisture     dry    Skin Elasticity     quick return to original state    Skin Integrity     wound(s);pressure ulcer(s)    Jack Risk Assessment (If Jack score </= 18, add the appropriate CPG to the care plan)    Sensory Perception  2-->very limited   2-->very limited    Moisture  3-->occasionally moist   3-->occasionally moist    Activity  1-->bedfast   1-->bedfast    Mobility  2-->very limited   2-->very limited    Nutrition  2-->probably inadequate   2-->probably inadequate    Friction and Shear  1-->problem   1-->problem    Jack Score  11   11    Incision 03/29/17 1108 Right foot    Incision - Properties Group Placement Date: 03/29/17 Placement Time: 1108 Side: Right Location: foot    Incision WDL     --   unable to assess, surgical dressing in place    Dressing Appearance     dry;intact;no drainage    Wound 03/27/17 1500 Right medial other (see comments)    Wound - Properties Group Date first assessed: 03/27/17 Time first assessed: 1500 Side: Right Orientation: medial Type: other (see comments) , Diabetic Ulcer     Wound WDL      WDL except   surgical dressing in place    Dressing Appearance     dry;intact    Drainage Amount     --    Picture taken     On admission    Wound 03/27/17 1500 Right medial foot other (see comments)    Wound - Properties Group Date first assessed: 03/27/17 Time first assessed: 1500 Side: Right Orientation: medial Location: foot Type: other (see comments) , ulcer     Wound WDL      WDL except   surgical dressing in place    Dressing Appearance     dry;intact    Drainage Amount     --    Wound 03/27/17 1500 Left lateral hip other (see comments)    Wound - Properties Group Date first assessed: 03/27/17 Time first assessed: 1500 Side: Left Orientation: lateral Location: hip Type: other (see comments) , redness     Wound WDL      WDL except    Dressing Appearance     no drainage    Base     reddened   blanches    Periwound Area     redness;blanchable     Drainage Amount     --    Wound 03/27/17 1500 Bilateral medial groin other (see comments)    Wound - Properties Group Date first assessed: 03/27/17 Time first assessed: 1500 Side: Bilateral Orientation: medial Location: groin Type: other (see comments) , excoriation     Wound WDL      WDL except    Base     reddened    Periwound Area     pink    Wound Interventions     barrier applied    Wound 03/27/17 1500 Bilateral medial scrotum abrasion    Wound - Properties Group Date first assessed: 03/27/17 Time first assessed: 1500 Side: Bilateral Orientation: medial Location: scrotum Type: abrasion    Wound WDL      WDL except    Base     reddened    Periwound Area     excoriated;redness    Pressure Ulcer 03/27/17 1500 Left lateral other (see comments) Stage II    Pressure Ulcer - Properties Group Date first assessed: 03/27/17 Time first assessed: 1500 Present On Admission (Pressure Ulcer): yes;picture taken Side: Left Orientation: lateral Location: other (see comments) , lt malleous  Stage: Stage II    Pressure Ulcer Appearance     dry;reddened;ecchymotic    Periwound Area     redness    Skin Interventions    Pressure Reduction Techniques     weight shift assistance provided    Musculoskeletal    Musculoskeletal WDL      WDL except;mobility    General Mobility     significantly impaired    Functional Screen Current    Ambulation     4-->completely dependent    Transferring     4-->completely dependent    Toileting     4-->completely dependent    Bathing     4-->completely dependent    Dressing     4-->completely dependent    Eating     4-->completely dependent    Communication     0-->understands/communicates without difficulty    Swallowing (if score 2 or more for any item, consult Rehab Services)     0-->swallows foods/liquids without difficulty    Nutrition    Diet/Nutrition Prescription     consistent carb/diabetic diet;low saturated fat/low cholesterol    Diet/Feeding Assistance     assisted with feeding    [REMOVED]  Peripheral IV Line - Single Lumen 03/27/17 1506 20 gauge    Peripheral IV Line - Properties Group Placement Date: 03/27/17 Placement Time: 1506 Gauge/Length: 20 gauge Unsuccessful Attempt Location/Site: basilic vein (medial side of arm), right Removal Date: 04/03/17 Removal Time: 0627    Indication/Daily Review of Necessity     medication therapy continuous    Site Preparation/Maintenance    dressing: dry and intact dressing: dry and intact    Securement     site guard in place    Patency/Maintenance     flushed without difficulty    Pump Type and Serial Number     infusion pump    Phlebitis    0-->no symptoms 0-->no symptoms    Infiltration    0-->no symptoms 0-->no symptoms    Sepsis Screening Tool    Previous screen positive?  no   no    Are 2 or > of the above criteria present?  no: STOP/negative screen   no: STOP/negative screen    Safety    Safety WDL  WDL except  WDL except;safety factors safety factors; WDL except      Safety  call light in reach;wheels locked;bed in low position;upper side rails raised x 2;ID band on ID band on;call light in reach;upper side rails raised x 2;wheels locked      [facility] High Risk Falls Assessment (If Fall score is >/=13, add the Fall Risk CPG to the care plan)     Fallen in past 6 months  5--> Yes   5--> Yes    Mental Status  0--> no mental status change   0--> no mental status change    Elimination  2--> Frequent toileting   2--> Frequent toileting    Mobility  2--> Requires assistance- transfer, walker, etc.   2--> Requires assistance- transfer, walker, etc.    Medications  1--> Narcotics   1--> Narcotics    Nurses' Clinical Judgement  10   10    Total Fall Risk Score  21   21    Safety Interventions    Safety Promotion/Fall Prevention  safety round/check completed safety round/check completed  safety round/check completed    All Alarms  alarm(s) activated and audible alarm(s) activated and audible      Safety/Security Measures  bed alarm set       Environmental  Safety Modification  assistive device/personal items within reach assistive device/personal items within reach  assistive device/personal items within reach    Elopement Precautions  bed alarm bed alarm      Daily Care    Activity Type  activity encouraged       Activity Level of Assistance     assistance, 2 people    Positioning    Positioning  right tilt right tilt      Positioning/Transfer Devices  pillows pillows        04/01/17 0500 04/01/17 0700 04/01/17 0900 04/01/17 1015 04/01/17 1109    Pain/Comfort/Sleep    Presence Of Pain  denies pain/discomfort denies pain/discomfort denies pain/discomfort denies pain/discomfort    Preferred Pain Scale  number (Numeric Rating Pain Scale) number (Numeric Rating Pain Scale) number (Numeric Rating Pain Scale) number (Numeric Rating Pain Scale)    Pain Rating (0-10): Rest  0 0 0 0    Sleep/Rest/Relaxation    awake appears asleep    Coping/Psychosocial    Observed Emotional State    calm;accepting     Plan Of Care Reviewed With    patient     Family/Support System    Family Member/Support Person(s)    family     Involvement in Care    not present at bedside     HEENT    HEENT WDL     WDL except     Additional Documentation    Teeth WDL (Group)     Teeth WDL    Teeth WDL     WDL except   decayed     Cognitive    Cognitive/Neuro/Behavioral WDL     WDL except;orientation     Level of Consciousness    Alert     Orientation    disoriented to;time     Neuro    Additional Documentation    Yolanda Coma Scale (Group)     Yolanda Coma Scale    Best Eye Response    4-->(E4) spontaneous     Best Motor Response    6-->(M6) obeys commands     Best Verbal Response    5-->(V5) oriented   forgetful at times     Yolanda Coma Scale Score    15     Respiratory    Respiratory WDL     WDL except;breath sounds     Cough And Deep Breathing    done with encouragement     Breath Sounds    Breath Sounds    All Fields     All Fields Breath Sounds    diminished     Oxygen Therapy    O2 Device    room  air     Cardiac    Cardiac WDL    WDL     Peripheral Neurovascular    Peripheral Neurovascular WDL    WDL     Venous Thromboembolism Prevent/Manage    anticoagulant therapy maintained     Additional Documentation    Dorsalis Pedis Pulse (Group)     Neurovascular Assessment    All Extremities    LLE;RLE     LLE Temperature    cool     LLE Color    pale     LLE Sensation    no tingling;no numbness     RLE Temperature    cool     RLE Color    pale     RLE Sensation    no tingling;no numbness     Dorsalis Pedis Pulse    Left Dorsalis Pedis Pulse    1+ (weak)     Right Dorsalis Pedis Pulse    unable to assess   ace wrap in place     Gastrointestinal    GI WDL    WDL     Stool Amount    small     Last Bowel Movement    03/30/17     Genitourinary    Genitourinary WDL    WDL     Urine Characteristics    yellow     Skin    Skin WDL     WDL except     Skin Temperature    warm     Skin Moisture    dry     Skin Elasticity    quick return to original state     Skin Integrity    wound(s);pressure ulcer(s)     Jack Risk Assessment (If Jack score </= 18, add the appropriate CPG to the care plan)    Sensory Perception    2-->very limited     Moisture    3-->occasionally moist     Activity    1-->bedfast     Mobility    2-->very limited     Nutrition    3-->adequate     Friction and Shear    2-->potential problem     Jack Score    13     Incision 03/29/17 1108 Right foot    Incision - Properties Group Placement Date: 03/29/17 Placement Time: 1108 Side: Right Location: foot    Incision WDL    --   unable to assess, surgical dressing in place     Dressing Appearance    dry;intact;no drainage     Wound 03/27/17 1500 Right medial other (see comments)    Wound - Properties Group Date first assessed: 03/27/17 Time first assessed: 1500 Side: Right Orientation: medial Type: other (see comments) , Diabetic Ulcer     Wound WDL     WDL except   surgical dressing in place     Dressing Appearance    dry;intact     Wound 03/27/17 1500 Right  medial foot other (see comments)    Wound - Properties Group Date first assessed: 03/27/17 Time first assessed: 1500 Side: Right Orientation: medial Location: foot Type: other (see comments) , ulcer     Wound WDL     WDL except   surgical dressing in place     Dressing Appearance    dry;intact     Wound 03/27/17 1500 Left lateral hip other (see comments)    Wound - Properties Group Date first assessed: 03/27/17 Time first assessed: 1500 Side: Left Orientation: lateral Location: hip Type: other (see comments) , redness     Wound WDL     WDL except     Dressing Appearance    no drainage     Base    reddened   blanches     Periwound Area    redness;blanchable     Drainage Amount    none     Wound 03/27/17 1500 Bilateral medial groin other (see comments)    Wound - Properties Group Date first assessed: 03/27/17 Time first assessed: 1500 Side: Bilateral Orientation: medial Location: groin Type: other (see comments) , excoriation     Wound WDL     WDL except     Base    reddened     Periwound Area    pink     Wound 03/27/17 1500 Bilateral medial scrotum abrasion    Wound - Properties Group Date first assessed: 03/27/17 Time first assessed: 1500 Side: Bilateral Orientation: medial Location: scrotum Type: abrasion    Wound WDL     WDL except     Base    reddened     Periwound Area    excoriated;redness     Pressure Ulcer 03/27/17 1500 Left lateral other (see comments) Stage II    Pressure Ulcer - Properties Group Date first assessed: 03/27/17 Time first assessed: 1500 Present On Admission (Pressure Ulcer): yes;picture taken Side: Left Orientation: lateral Location: other (see comments) , lt malleous  Stage: Stage II    Pressure Ulcer Appearance    dry;reddened;ecchymotic     Periwound Area    redness     Skin Interventions    Pressure Reduction Devices    specialty bed utilized     Pressure Reduction Techniques    weight shift assistance provided     Musculoskeletal    Musculoskeletal WDL     WDL except;mobility     General  Mobility    significantly impaired     Extremity Movement    LUE;RUE;LLE;RLE     LUE Extremity Movement    active ROM severely impaired     RUE Extremity Movement    active ROM moderately impaired     LLE Extremity Movement    active ROM severely impaired     RLE Extremity Movement    active ROM moderately impaired     Functional Screen Current    Ambulation    4-->completely dependent     Transferring    4-->completely dependent     Toileting    4-->completely dependent     Bathing    4-->completely dependent     Dressing    4-->completely dependent     Eating    4-->completely dependent     Communication    0-->understands/communicates without difficulty     Swallowing (if score 2 or more for any item, consult Rehab Services)    0-->swallows foods/liquids without difficulty     Nutrition    Diet/Nutrition Prescription    consistent carb/diabetic diet;low saturated fat/low cholesterol     Diet/Feeding Assistance    assisted with feeding     Nutrition Risk Screen    no indicators present     [REMOVED] Peripheral IV Line - Single Lumen 03/27/17 1506 20 gauge    Peripheral IV Line - Properties Group Placement Date: 03/27/17 Placement Time: 1506 Gauge/Length: 20 gauge Unsuccessful Attempt Location/Site: basilic vein (medial side of arm), right Removal Date: 04/03/17 Removal Time: 0627    Indication/Daily Review of Necessity fluid therapy continuous   fluid therapy continuous     Site Preparation/Maintenance dressing: dry and intact   dressing: dry and intact     Securement    site guard in place     Patency/Maintenance    flushed without difficulty     Pump Type and Serial Number --   pt refused site change, pt ed    infusion pump   pt refused site change, pt ed      Phlebitis 0-->no symptoms   0-->no symptoms     Infiltration 0-->no symptoms   0-->no symptoms     Sepsis Screening Tool    Previous screen positive?    no     Are 2 or > of the above criteria present?    no: STOP/negative screen     Safety    Safety WDL   RUTL RUTL WDL WDL    Safety  call light in reach call light in reach call light in reach call light in reach    [facility] High Risk Falls Assessment (If Fall score is >/=13, add the Fall Risk CPG to the care plan)     Fallen in past 6 months    0--> No     Mental Status    1--> confused     Elimination    2--> Frequent toileting     Mobility    2--> Requires assistance- transfer, walker, etc.     Medications    1--> Insulin/ Oral hypoglycemic     Nurses' Clinical Judgement    10     Total Fall Risk Score    17     Safety Interventions    Safety Promotion/Fall Prevention  safety round/check completed safety round/check completed safety round/check completed safety round/check completed    All Alarms  alarm(s) activated and audible alarm(s) activated and audible alarm(s) activated and audible alarm(s) activated and audible    Safety/Security Measures  bed alarm set bed alarm set bed alarm set bed alarm set    Environmental Safety Modification  assistive device/personal items within reach assistive device/personal items within reach assistive device/personal items within reach assistive device/personal items within reach    Elopement Precautions  bed alarm bed alarm bed alarm bed alarm      04/01/17 1300 04/01/17 1500 04/01/17 1700 04/01/17 2030 04/02/17 0200    Pain/Comfort/Sleep    Presence Of Pain denies pain/discomfort denies pain/discomfort denies pain/discomfort denies pain/discomfort     Preferred Pain Scale number (Numeric Rating Pain Scale) number (Numeric Rating Pain Scale) number (Numeric Rating Pain Scale) number (Numeric Rating Pain Scale) number (Numeric Rating Pain Scale)    Pain Rating (0-10): Rest 0 0 0      Sleep/Rest/Relaxation    awake     Coping/Psychosocial    Observed Emotional State    calm;accepting     Plan Of Care Reviewed With    patient     Family/Support System    Family Member/Support Person(s)    family     Involvement in Care    not present at bedside     DEREK BETTENCOURTL     WDL  except     Additional Documentation    Teeth WDL (Group)     Teeth WDL    Teeth WDL     WDL except   decayed     Cognitive    Cognitive/Neuro/Behavioral WDL     WDL except;orientation     Level of Consciousness    Alert     Orientation    disoriented to;time     Neuro    Additional Documentation    [place] Coma Scale (Group)     [place] Coma Scale    Best Eye Response    4-->(E4) spontaneous     Best Motor Response    6-->(M6) obeys commands     Best Verbal Response    5-->(V5) oriented   forgetful at times     Yolanda Coma Scale Score    15     Respiratory    Respiratory WDL     WDL except;breath sounds     Cough And Deep Breathing    done with encouragement     Breath Sounds    Breath Sounds    All Fields     All Fields Breath Sounds    diminished     Oxygen Therapy    O2 Device    room air     Cardiac    Cardiac WDL    WDL     Peripheral Neurovascular    Peripheral Neurovascular WDL    WDL     Venous Thromboembolism Prevent/Manage    anticoagulant therapy maintained     Additional Documentation    Dorsalis Pedis Pulse (Group)     Neurovascular Assessment    All Extremities    LLE;RLE     LLE Temperature    cool     LLE Color    pale     LLE Sensation    no tingling;no numbness     RLE Temperature    cool     RLE Color    pale     RLE Sensation    no tingling;no numbness     Dorsalis Pedis Pulse    Left Dorsalis Pedis Pulse    1+ (weak)     Right Dorsalis Pedis Pulse    unable to assess   ace wrap in place     Gastrointestinal    GI WDL    WDL     Stool Amount    small     Last Bowel Movement    04/01/17     Genitourinary    Genitourinary WDL    WDL     Urine Characteristics    yellow     Skin    Skin WDL     WDL except     Skin Temperature    warm     Skin Moisture    dry     Skin Elasticity    quick return to original state     Skin Integrity    wound(s);pressure ulcer(s)     Jack Risk Assessment (If Jack score </= 18, add the appropriate CPG to the care plan)    Sensory Perception    2-->very limited      Moisture    3-->occasionally moist     Activity    1-->bedfast     Mobility    2-->very limited     Nutrition    3-->adequate     Friction and Shear    2-->potential problem     Jack Score    13     Incision 03/29/17 1108 Right foot    Incision - Properties Group Placement Date: 03/29/17 Placement Time: 1108 Side: Right Location: foot    Incision WDL    --   unable to assess, surgical dressing in place     Dressing Appearance    dry;intact;no drainage     Wound 03/27/17 1500 Right medial other (see comments)    Wound - Properties Group Date first assessed: 03/27/17 Time first assessed: 1500 Side: Right Orientation: medial Type: other (see comments) , Diabetic Ulcer     Wound WDL     WDL except   surgical dressing in place     Dressing Appearance    dry;intact     Wound 03/27/17 1500 Right medial foot other (see comments)    Wound - Properties Group Date first assessed: 03/27/17 Time first assessed: 1500 Side: Right Orientation: medial Location: foot Type: other (see comments) , ulcer     Wound WDL     WDL except   surgical dressing in place     Dressing Appearance    dry;intact     Wound 03/27/17 1500 Left lateral hip other (see comments)    Wound - Properties Group Date first assessed: 03/27/17 Time first assessed: 1500 Side: Left Orientation: lateral Location: hip Type: other (see comments) , redness     Wound WDL     WDL except     Dressing Appearance    no drainage     Base    reddened   blanches     Periwound Area    redness;blanchable     Drainage Amount    none     Wound 03/27/17 1500 Bilateral medial groin other (see comments)    Wound - Properties Group Date first assessed: 03/27/17 Time first assessed: 1500 Side: Bilateral Orientation: medial Location: groin Type: other (see comments) , excoriation     Wound WDL     WDL except     Base    reddened     Periwound Area    pink     Wound 03/27/17 1500 Bilateral medial scrotum abrasion    Wound - Properties Group Date first assessed: 03/27/17 Time first  assessed: 1500 Side: Bilateral Orientation: medial Location: scrotum Type: abrasion    Wound WDL     WDL except     Base    reddened     Periwound Area    excoriated;redness     Pressure Ulcer 03/27/17 1500 Left lateral other (see comments) Stage II    Pressure Ulcer - Properties Group Date first assessed: 03/27/17 Time first assessed: 1500 Present On Admission (Pressure Ulcer): yes;picture taken Side: Left Orientation: lateral Location: other (see comments) , lt malleous  Stage: Stage II    Pressure Ulcer Appearance    dry;reddened;ecchymotic     Periwound Area    redness     Musculoskeletal    Musculoskeletal WDL     WDL except;mobility     General Mobility    significantly impaired     Extremity Movement    LUE;RUE;LLE;RLE     LUE Extremity Movement    active ROM severely impaired     RUE Extremity Movement    active ROM moderately impaired     LLE Extremity Movement    active ROM severely impaired     RLE Extremity Movement    active ROM moderately impaired     Functional Screen Current    Ambulation    4-->completely dependent     Transferring    4-->completely dependent     Toileting    4-->completely dependent     Bathing    4-->completely dependent     Dressing    4-->completely dependent     Eating    4-->completely dependent     Communication    0-->understands/communicates without difficulty     Swallowing (if score 2 or more for any item, consult Rehab Services)    0-->swallows foods/liquids without difficulty     Nutrition    Diet/Nutrition Prescription    consistent carb/diabetic diet;low saturated fat/low cholesterol     Diet/Feeding Assistance    assisted with feeding     Nutrition Risk Screen    no indicators present     [REMOVED] Peripheral IV Line - Single Lumen 03/27/17 1506 20 gauge    Peripheral IV Line - Properties Group Placement Date: 03/27/17 Placement Time: 1506 Gauge/Length: 20 gauge Unsuccessful Attempt Location/Site: basilic vein (medial side of arm), right Removal Date: 04/03/17  Removal Time: 0627    Indication/Daily Review of Necessity    fluid therapy continuous     Site Preparation/Maintenance    dressing: dry and intact     Securement    site guard in place     Pump Type and Serial Number    infusion pump   pt refused site change, pt ed      Phlebitis    0-->no symptoms     Infiltration    0-->no symptoms     Sepsis Screening Tool    Previous screen positive?    no     Are 2 or > of the above criteria present?    no: STOP/negative screen     Safety    Safety WDL WDL WDL WDL WDL     Safety call light in reach call light in reach call light in reach      [facility] High Risk Falls Assessment (If Fall score is >/=13, add the Fall Risk CPG to the care plan)     Fallen in past 6 months    0--> No     Mental Status    1--> confused     Elimination    2--> Urgency     Mobility    2--> Requires assistance- transfer, walker, etc.     Medications    1--> Insulin/ Oral hypoglycemic     Nurses' Clinical Judgement    10     Total Fall Risk Score    17     Safety Interventions    Safety Promotion/Fall Prevention safety round/check completed safety round/check completed safety round/check completed safety round/check completed     All Alarms alarm(s) activated and audible alarm(s) activated and audible alarm(s) activated and audible alarm(s) activated and audible     Safety/Security Measures bed alarm set bed alarm set bed alarm set      Environmental Safety Modification assistive device/personal items within reach assistive device/personal items within reach assistive device/personal items within reach assistive device/personal items within reach     Elopement Precautions bed alarm bed alarm bed alarm        04/02/17 0700 04/02/17 0900 04/02/17 1050 04/02/17 1100 04/02/17 1300    Pain/Comfort/Sleep    Presence Of Pain denies pain/discomfort denies pain/discomfort denies pain/discomfort denies pain/discomfort denies pain/discomfort    Preferred Pain Scale number (Numeric Rating Pain Scale)  number  (Numeric Rating Pain Scale) number (Numeric Rating Pain Scale) number (Numeric Rating Pain Scale)    Pain Rating (0-10): Rest 0 0 0 0 0    Sleep/Rest/Relaxation appears asleep appears asleep  appears asleep     Coping/Psychosocial    Observed Emotional State   calm;accepting      Plan Of Care Reviewed With   patient      Family/Support System    Family Member/Support Person(s)   family      Involvement in Care   not present at bedside      HEENT    HEENT WDL    WDL except      Additional Documentation   Teeth WDL (Group)      Teeth WDL    Teeth WDL    WDL except   decayed      Cognitive    Cognitive/Neuro/Behavioral WDL    WDL except;orientation      Level of Consciousness   Alert      Orientation   disoriented to;time      Neuro    Additional Documentation   [place] Coma Scale (Group)      [place] Coma Scale    Best Eye Response   4-->(E4) spontaneous      Best Motor Response   6-->(M6) obeys commands      Best Verbal Response   5-->(V5) oriented   forgetful at times      Yolanda Coma Scale Score   15      Respiratory    Respiratory WDL    WDL except;breath sounds      Cough And Deep Breathing   done with encouragement      Breath Sounds    Breath Sounds   All Fields      All Fields Breath Sounds   diminished      Oxygen Therapy    O2 Device   room air      Cardiac    Cardiac WDL   WDL      Peripheral Neurovascular    Peripheral Neurovascular WDL   WDL      Venous Thromboembolism Prevent/Manage   anticoagulant therapy maintained      Additional Documentation   Dorsalis Pedis Pulse (Group)      Neurovascular Assessment    All Extremities   LLE;RLE      LLE Temperature   cool      LLE Color   pale      LLE Sensation   no tingling;no numbness      RLE Temperature   cool      RLE Color   pale      RLE Sensation   no tingling;no numbness      Dorsalis Pedis Pulse    Left Dorsalis Pedis Pulse   1+ (weak)      Right Dorsalis Pedis Pulse   unable to assess   ace wrap in place      Gastrointestinal    GI WDL   WDL      Last  Bowel Movement   04/01/17      Genitourinary    Genitourinary WDL   WDL      Skin    Skin WDL    WDL except      Skin Temperature   warm      Skin Moisture   dry      Skin Elasticity   quick return to original state      Skin Integrity   wound(s);pressure ulcer(s)      Jack Risk Assessment (If Jack score </= 18, add the appropriate CPG to the care plan)    Sensory Perception   2-->very limited      Moisture   3-->occasionally moist      Activity   1-->bedfast      Mobility   2-->very limited      Nutrition   3-->adequate      Friction and Shear   2-->potential problem      Jack Score   13      Incision 03/29/17 1108 Right foot    Incision - Properties Group Placement Date: 03/29/17 Placement Time: 1108 Side: Right Location: foot    Incision WDL   --   unable to assess, surgical dressing in place      Dressing Appearance   dry;intact;no drainage      Wound 03/27/17 1500 Right medial other (see comments)    Wound - Properties Group Date first assessed: 03/27/17 Time first assessed: 1500 Side: Right Orientation: medial Type: other (see comments) , Diabetic Ulcer     Wound WDL    WDL except   surgical dressing in place      Dressing Appearance   dry;intact      Wound 03/27/17 1500 Right medial foot other (see comments)    Wound - Properties Group Date first assessed: 03/27/17 Time first assessed: 1500 Side: Right Orientation: medial Location: foot Type: other (see comments) , ulcer     Wound WDL    WDL except   surgical dressing in place      Dressing Appearance   dry;intact      Wound 03/27/17 1500 Left lateral hip other (see comments)    Wound - Properties Group Date first assessed: 03/27/17 Time first assessed: 1500 Side: Left Orientation: lateral Location: hip Type: other (see comments) , redness     Wound WDL    WDL except      Dressing Appearance   no drainage      Base   reddened   blanches      Periwound Area   redness;blanchable      Wound 03/27/17 1500 Bilateral medial groin other (see comments)    Wound  - Properties Group Date first assessed: 03/27/17 Time first assessed: 1500 Side: Bilateral Orientation: medial Location: groin Type: other (see comments) , excoriation     Wound WDL    WDL except      Base   reddened      Periwound Area   pink      Wound 03/27/17 1500 Bilateral medial scrotum abrasion    Wound - Properties Group Date first assessed: 03/27/17 Time first assessed: 1500 Side: Bilateral Orientation: medial Location: scrotum Type: abrasion    Wound WDL    WDL except      Base   reddened      Periwound Area   excoriated;redness      Pressure Ulcer 03/27/17 1500 Left lateral other (see comments) Stage II    Pressure Ulcer - Properties Group Date first assessed: 03/27/17 Time first assessed: 1500 Present On Admission (Pressure Ulcer): yes;picture taken Side: Left Orientation: lateral Location: other (see comments) , lt malleous  Stage: Stage II    Pressure Ulcer Appearance   dry;reddened;ecchymotic      Periwound Area   redness      Skin Interventions    Pressure Reduction Devices   specialty bed utilized      Pressure Reduction Techniques   weight shift assistance provided      Musculoskeletal    Musculoskeletal WDL    WDL except;mobility      General Mobility   significantly impaired      Extremity Movement   LUE;RUE;LLE;RLE      LUE Extremity Movement   active ROM severely impaired      RUE Extremity Movement   active ROM moderately impaired      LLE Extremity Movement   active ROM severely impaired      RLE Extremity Movement   active ROM moderately impaired      Functional Screen Current    Ambulation   4-->completely dependent      Transferring   4-->completely dependent      Toileting   4-->completely dependent      Bathing   4-->completely dependent      Dressing   4-->completely dependent      Eating   4-->completely dependent      Communication   0-->understands/communicates without difficulty      Swallowing (if score 2 or more for any item, consult Rehab Services)   0-->swallows foods/liquids  without difficulty      Nutrition    Diet/Nutrition Prescription   consistent carb/diabetic diet;low saturated fat/low cholesterol      Diet/Feeding Assistance   assisted with feeding      Nutrition Risk Screen   no indicators present      [REMOVED] Peripheral IV Line - Single Lumen 03/27/17 1506 20 gauge    Peripheral IV Line - Properties Group Placement Date: 03/27/17 Placement Time: 1506 Gauge/Length: 20 gauge Unsuccessful Attempt Location/Site: basilic vein (medial side of arm), right Removal Date: 04/03/17 Removal Time: 0627    Indication/Daily Review of Necessity   fluid therapy continuous;medication therapy intermittent      Site Preparation/Maintenance   dressing: dry and intact      Securement   site guard in place      Patency/Maintenance   flushed without difficulty      Pump Type and Serial Number   infusion pump   pt refused site change, pt ed       Phlebitis   0-->no symptoms      Infiltration   0-->no symptoms      Sepsis Screening Tool    Previous screen positive?   no      Are 2 or > of the above criteria present?   no: STOP/negative screen      Safety    Safety WDL WDL WDL WDL WDL WDL    Safety call light in reach call light in reach call light in reach call light in reach call light in reach    [facility] High Risk Falls Assessment (If Fall score is >/=13, add the Fall Risk CPG to the care plan)     Fallen in past 6 months   0--> No      Mental Status   1--> confused      Elimination   2--> Urgency      Mobility   2--> Requires assistance- transfer, walker, etc.      Medications   1--> Insulin/ Oral hypoglycemic      Nurses' Clinical Judgement   10      Total Fall Risk Score   17      Safety Interventions    Safety Promotion/Fall Prevention safety round/check completed safety round/check completed safety round/check completed safety round/check completed safety round/check completed    All Alarms alarm(s) activated and audible alarm(s) activated and audible alarm(s) activated and audible  alarm(s) activated and audible alarm(s) activated and audible    Safety/Security Measures bed alarm set bed alarm set bed alarm set bed alarm set bed alarm set    Environmental Safety Modification assistive device/personal items within reach assistive device/personal items within reach assistive device/personal items within reach assistive device/personal items within reach assistive device/personal items within reach    Elopement Precautions bed alarm bed alarm bed alarm bed alarm bed alarm      04/02/17 1500 04/02/17 1700 04/02/17 1855 04/02/17 2110 04/03/17 0821    Pain/Comfort/Sleep    Presence Of Pain denies pain/discomfort denies pain/discomfort denies pain/discomfort denies pain/discomfort denies pain/discomfort    Preferred Pain Scale number (Numeric Rating Pain Scale) number (Numeric Rating Pain Scale)  number (Numeric Rating Pain Scale) number (Numeric Rating Pain Scale)    Pain Rating (0-10): Rest 0 0 0  0    Sleep/Rest/Relaxation     awake    Coping/Psychosocial    Observed Emotional State    calm;accepting calm;accepting    Plan Of Care Reviewed With    patient     Family/Support System    Family Member/Support Person(s)    family family    Involvement in Care    not present at bedside not present at bedside    HEENT    HEENT WDL     WDL except  WDL except    Additional Documentation    Teeth WDL (Group) Teeth WDL (Group)    Teeth WDL    Teeth WDL     WDL except   decayed  WDL except   decayed    Cognitive    Cognitive/Neuro/Behavioral WDL     WDL except;orientation  WDL except;orientation    Level of Consciousness    Alert Alert    Orientation    disoriented to;time disoriented to;time    Neuro    Additional Documentation    Yolanda Coma Scale (Group) Yolanda Coma Scale (Group)    [place] Coma Scale    Best Eye Response    4-->(E4) spontaneous 4-->(E4) spontaneous    Best Motor Response    6-->(M6) obeys commands 6-->(M6) obeys commands    Best Verbal Response    5-->(V5) oriented   forgetful at times  5-->(V5) oriented   forgetful at times    [place] Coma Scale Score    15 15    Respiratory    Respiratory WDL     WDL except;breath sounds  WDL except;breath sounds    Cough And Deep Breathing    done with encouragement     Breath Sounds    Breath Sounds    All Fields All Fields    All Fields Breath Sounds    diminished crackles, fine    Oxygen Therapy    O2 Device    room air room air    Cardiac    Cardiac WDL    WDL WDL    Peripheral Neurovascular    Peripheral Neurovascular WDL    WDL WDL    Venous Thromboembolism Prevent/Manage    anticoagulant therapy maintained     Additional Documentation    Dorsalis Pedis Pulse (Group) Dorsalis Pedis Pulse (Group)    Neurovascular Assessment    All Extremities    LLE;RLE LLE;RLE    LLE Temperature    cool cool    LLE Color    pale pale    LLE Sensation    no tingling;no numbness no tingling;no numbness    RLE Temperature    cool cool    RLE Color    pale pale    RLE Sensation    no tingling;no numbness no tingling;no numbness    Dorsalis Pedis Pulse    Left Dorsalis Pedis Pulse    1+ (weak) 1+ (weak)    Right Dorsalis Pedis Pulse    unable to assess   ace wrap in place unable to assess   ace wrap in place    Gastrointestinal    GI WDL    WDL WDL    Last Bowel Movement    04/02/17 04/02/17    Genitourinary    Genitourinary WDL    WDL WDL    Urine Characteristics     yellow    Skin    Skin WDL     WDL except  WDL except    Skin Temperature    warm warm    Skin Moisture    dry dry    Skin Elasticity    quick return to original state quick return to original state    Skin Integrity    wound(s);pressure ulcer(s) wound(s);pressure ulcer(s)    Jack Risk Assessment (If Jack score </= 18, add the appropriate CPG to the care plan)    Sensory Perception    2-->very limited 2-->very limited    Moisture    3-->occasionally moist 2-->very moist    Activity    1-->bedfast 1-->bedfast    Mobility    2-->very limited 2-->very limited    Nutrition    3-->adequate 3-->adequate    Friction  and Shear    2-->potential problem 2-->potential problem    Jack Score    13 12    Incision 03/29/17 1108 Right foot    Incision - Properties Group Placement Date: 03/29/17 Placement Time: 1108 Side: Right Location: foot    Incision WDL    --   unable to assess, surgical dressing in place WDL   unable to assess, surgical dressing in place    Dressing Appearance    dry;intact;no drainage dry;intact;no drainage    Dressing     Dressing changed;other (see comments)   per Dr. Maza this morning 4/3/2017    Wound 03/27/17 1500 Right medial other (see comments)    Wound - Properties Group Date first assessed: 03/27/17 Time first assessed: 1500 Side: Right Orientation: medial Type: other (see comments) , Diabetic Ulcer     Wound WDL     WDL except   surgical dressing in place  WDL except   surgical dressing in place    Dressing Appearance    dry;intact     Picture taken     On admission    Wound 03/27/17 1500 Right medial foot other (see comments)    Wound - Properties Group Date first assessed: 03/27/17 Time first assessed: 1500 Side: Right Orientation: medial Location: foot Type: other (see comments) , ulcer     Wound WDL     WDL except   surgical dressing in place  WDL except   surgical dressing in place    Dressing Appearance    dry;intact     Drainage Amount     none    Wound 03/27/17 1500 Left lateral hip other (see comments)    Wound - Properties Group Date first assessed: 03/27/17 Time first assessed: 1500 Side: Left Orientation: lateral Location: hip Type: other (see comments) , redness     Wound WDL     WDL except  WDL except    Dressing Appearance    no drainage no drainage    Base    reddened   blanches reddened   blanches    Periwound Area    redness;blanchable redness;blanchable    Drainage Amount     none    Wound 03/27/17 1500 Bilateral medial groin other (see comments)    Wound - Properties Group Date first assessed: 03/27/17 Time first assessed: 1500 Side: Bilateral Orientation: medial Location: groin  Type: other (see comments) , excoriation     Wound WDL     WDL except  WDL except    Base    reddened reddened    Periwound Area    pink pink    Wound Interventions     barrier applied    Wound 03/27/17 1500 Bilateral medial scrotum abrasion    Wound - Properties Group Date first assessed: 03/27/17 Time first assessed: 1500 Side: Bilateral Orientation: medial Location: scrotum Type: abrasion    Wound WDL     WDL except  WDL except    Base    reddened reddened    Periwound Area    excoriated;redness excoriated;redness    Pressure Ulcer 03/27/17 1500 Left lateral other (see comments) Stage II    Pressure Ulcer - Properties Group Date first assessed: 03/27/17 Time first assessed: 1500 Present On Admission (Pressure Ulcer): yes;picture taken Side: Left Orientation: lateral Location: other (see comments) , lt malleous  Stage: Stage II    Dressing Appearance     dry;intact    Pressure Ulcer Appearance    dry;reddened;ecchymotic dry;reddened;ecchymotic    Periwound Area    redness redness    Musculoskeletal    Musculoskeletal WDL     WDL except;mobility  WDL except;mobility    General Mobility    significantly impaired significantly impaired    Extremity Movement    LUE;RUE;LLE;RLE LUE;RUE;LLE;RLE    LUE Extremity Movement    active ROM severely impaired active ROM severely impaired    RUE Extremity Movement    active ROM moderately impaired active ROM moderately impaired    LLE Extremity Movement    active ROM severely impaired active ROM severely impaired    RLE Extremity Movement    active ROM moderately impaired active ROM moderately impaired    Functional Screen Current    Ambulation    4-->completely dependent 4-->completely dependent    Transferring    4-->completely dependent 4-->completely dependent    Toileting    4-->completely dependent 4-->completely dependent    Bathing    4-->completely dependent 4-->completely dependent    Dressing    4-->completely dependent 4-->completely dependent    Eating     4-->completely dependent 4-->completely dependent    Communication    0-->understands/communicates without difficulty 0-->understands/communicates without difficulty    Swallowing (if score 2 or more for any item, consult Rehab Services)    0-->swallows foods/liquids without difficulty     Nutrition    Diet/Nutrition Prescription    consistent carb/diabetic diet;low saturated fat/low cholesterol consistent carb/diabetic diet;low saturated fat/low cholesterol    Diet/Feeding Assistance    assisted with feeding assisted with feeding    Nutrition Risk Screen    no indicators present no indicators present    [REMOVED] Peripheral IV Line - Single Lumen 03/27/17 1506 20 gauge    Peripheral IV Line - Properties Group Placement Date: 03/27/17 Placement Time: 1506 Gauge/Length: 20 gauge Unsuccessful Attempt Location/Site: basilic vein (medial side of arm), right Removal Date: 04/03/17 Removal Time: 0627    Indication/Daily Review of Necessity    fluid therapy continuous     Site Preparation/Maintenance    dressing: dry and intact     Securement    site guard in place     Patency/Maintenance    flushed without difficulty     Pump Type and Serial Number    infusion pump   pt refused site change, pt ed      Phlebitis 0-->no symptoms   0-->no symptoms     Infiltration 0-->no symptoms   0-->no symptoms     Peripheral IV Line - Single Lumen 04/03/17 0213 basilic vein (medial side of arm), right 20 gauge    Peripheral IV Line - Properties Group Placement Date: 04/03/17 Placement Time: 0213 Location: basilic vein (medial side of arm), right Gauge/Length: 20 gauge Unsuccessful Insertion Attempts: 0    Indication/Daily Review of Necessity     fluid therapy continuous    Site Preparation/Maintenance     dressing: dry and intact    Securement     catheter stabilization device, secured with;sterile tape strips, secured with    Patency/Maintenance     flushed without difficulty    Pump Type and Serial Number     infusion pump    Phlebitis      0-->no symptoms    Infiltration     0-->no symptoms    Sepsis Screening Tool    Previous screen positive?    no no    Are 2 or > of the above criteria present?    no: STOP/negative screen no: STOP/negative screen    Safety    Safety WDL WDL WDL   WDL    Safety call light in reach call light in reach   wheels locked;call light in reach;upper side rails raised x 2;ID band on    [facility] High Risk Falls Assessment (If Fall score is >/=13, add the Fall Risk CPG to the care plan)     Fallen in past 6 months    0--> No 0--> No    Mental Status    1--> confused 1--> confused    Elimination    2--> Urgency 0--> No elimination issues    Mobility    2--> Requires assistance- transfer, walker, etc. 2--> Requires assistance- transfer, walker, etc.    Medications    1--> Insulin/ Oral hypoglycemic 1--> Insulin/ Oral hypoglycemic    Nurses' Clinical Judgement    10 10    Total Fall Risk Score    17 15    Safety Interventions    Safety Promotion/Fall Prevention safety round/check completed safety round/check completed safety round/check completed safety round/check completed safety round/check completed    All Alarms alarm(s) activated and audible alarm(s) activated and audible alarm(s) activated and audible alarm(s) activated and audible alarm(s) activated and audible    Safety/Security Measures bed alarm set bed alarm set   bed alarm set    Environmental Safety Modification assistive device/personal items within reach assistive device/personal items within reach assistive device/personal items within reach assistive device/personal items within reach assistive device/personal items within reach;clutter free environment maintained    Elopement Precautions bed alarm bed alarm       Daily Care    Activity Type     bedrest    Elimination Assistance     incontinence pad changed;incontinence care provided    Positioning    Positioning     side lying, left

## 2017-04-03 NOTE — PROGRESS NOTES
[facility] Medicine Services  INPATIENT PROGRESS NOTE     LOS: 7 days   Patient Care Team:  Amirah Archuleta MD as PCP - General (Family Medicine)    Chief Complaint:  <principal problem not specified>      Subjective     Interval History:     Patient Complaints: No new complaints. Doing well otherwise.     History taken from:   Review of Systems:    Review of Systems   Denies fever, chills, foot pain, chest pain or shortness of breath.      Objective     Vital Signs  Temp:  [96.1 °F (35.6 °C)-97.6 °F (36.4 °C)] 97.3 °F (36.3 °C)  Heart Rate:  [72-93] 92  Resp:  [18-20] 20  BP: (130-160)/(61-92) 150/79    Physical Exam:   Physical Exam   CV: Normal s1 and s2  Lungs: Good air entry bilaterally  Abdomen: Soft. Bowel sounds are present.  Extremity: Right foot is covered with dressing. DP pulsations were not appreciated.       Results Review:       Results from last 7 days  Lab Units 04/03/17  0731 04/01/17  0610 03/31/17  0602 03/29/17  0623 03/28/17  0640   SODIUM mmol/L 139 138 139 139 136*   POTASSIUM mmol/L 4.0 3.9 4.7 4.6 4.7   CHLORIDE mmol/L 103 107 107 109 109   TOTAL CO2 mmol/L 27.0 24.0 24.0 20.0* 18.0*   BUN mg/dL 20 19 16 24* 33*   CREATININE mg/dL 1.27 1.28 1.57* 1.87* 2.15*   GLUCOSE mg/dL 101* 99 99 86 77   CALCIUM mg/dL 8.3* 8.0* 8.2* 8.0* 8.2*   BILIRUBIN mg/dL 0.5 0.5 0.6 0.5 0.7   ALK PHOS U/L 169* 168* 162* 184* 193*   ALT (SGPT) U/L 41 38 46 37 45   AST (SGOT) U/L 60* 48 60* 37 49         Results from last 7 days  Lab Units 04/03/17  0731 04/01/17  0610 03/31/17  0602 03/29/17  0623   MAGNESIUM mg/dL 1.8 1.7 1.8 2.1         Results from last 7 days  Lab Units 04/03/17  0731 04/01/17  0610 03/31/17  0602 03/29/17  0623 03/28/17  0640   WBC 10*3/mm3 8.73 9.58 9.50 7.21 9.41   HEMOGLOBIN g/dL 10.6* 10.5* 10.6* 11.6* 11.7*   HEMATOCRIT % 32.9* 31.5* 31.9* 35.8* 36.0*   PLATELETS 10*3/mm3 326 312 316 340 335       No results found for: CKTOTAL, CKMB,  CKMBINDEX, TROPONINI, TROPONINT    CO2   Date Value Ref Range Status   04/03/2017 27.0 22.0 - 31.0 mmol/L Final              Imaging Results (last 7 days)     Procedure Component Value Units Date/Time    CT Lower Extremity Right Without Contrast [[id number]] Collected:  03/27/17 2124     Updated:  03/27/17 2136    Narrative:         [facility] [place]    Patient Name: LUIS ARIAS    ORDERING: JUANITA SEGURA    ATTENDING: WAYNE RODRIGUEZ     REFERRING: JUANITA SEGURA  -----------------------    PROCEDURE: CT right ankle and foot without contrast on 3/27/2017    CLINICAL INDICATION:  Right foot ulcer, osteomyelitis    TECHNIQUE: Multiple axial images are obtained throughout the  right ankle and foot without the administration of contrast.  Sagittal and coronal reformatted images are also performed and  reviewed. This study was performed with techniques to keep  radiation doses as low as reasonably achievable, (ALARA).   Total DLP is 206.7 mGy*cm.    COMPARISON: Right foot x-ray from 2/13/2017     FINDINGS: There is diffuse osteopenia. There is large soft tissue  ulceration adjacent to the head of the first metatarsal and first  MTP joint along its medial aspect. There is associated adjacent  air within the soft tissues and extending into the first MTP  joint. Extensive soft tissue changes are noted around this site  consistent with infection and cellulitis. There is bony erosion  of the head and neck of the first metatarsal especially along its  medial plantar aspect consistent with osteomyelitis. There is  also some bony erosion with adjacent air in the base of the first  proximal phalanx consistent with osteomyelitis at this location  as well. There is subluxation of the first MTP joint with  inferior positioning of the first metatarsal. The first  metatarsal head inferiorly appears to be exposed bone at the site  of osteomyelitis but please correlate clinically. Vascular  calcifications are noted. No  other definite CT site of  osteomyelitis is noted. Osteomyelitis involving the first  metatarsal extends proximally from the head of the first  metatarsal at least 2.3 cm into the first metatarsal diaphysis.  No definite fluid collection to suggest abscess is noted.      Impression:       CONCLUSION: Open ulceration adjacent to the first MTP joint with  evidence of infection and osteomyelitis involving the distal  first metatarsal and the base of the first proximal phalanx.    Electronically signed by:  Zach Kyle  3/27/2017 9:35 PM  CDT Workstation: [id number]    US Arterial Doppler Lower Extremity Complete [[id number]] Collected:  03/28/17 1807     Updated:  03/28/17 1817    Narrative:       Patient Name:  MR. LUIS ARAIS  Patient ID:  [id number]   Ordering:  NATHEN TAYLOR  Attending:  WAYNE RODRIGUEZ  Referring:  NATHEN TAYLOR  ------------------------------------------------      DUPLEX ARTERIAL ULTRASOUND OF THE LOWER EXTREMITIES.    INDICATION FOR PROCEDURE:  71 years -year-old patient presents  for evaluation of nonhealing ulceration of the right foot..    TECHNIQUE:  Color Doppler and Doppler evaluation of bilateral  lower extremity arteries.    FINDINGS:                                                 RIGHT                            CFA:                                    50.8 cm/sec              Proximal SFA:                      75 cm/sec              Mid SFA:                              Occluded.          Distal SFA                            79.4 cm/sec             Profundus Femoris             Not visualized.  Popliteal                              Occluded.               Posterior tibial                     Occluded         Peroneal                              Not visualized.                                                        LEFT    CFA:                                    137.6 cm/sec   Proximal SFA:                      91 cm/sec     Mid SFA:                              175.4 cm/sec     Distal SFA                            36 cm/sec    Profundus Femoris             Not visualized.  Popliteal                               Occluded.  Posterior tibial                      44 cm/sec  Peroneal                               Not visualized.    Wave forms are primarily biphasic and monophasic      Impression:       CONCLUSION:     1.  Occlusion of several calf arteries as well as the mid right  superficial femoral artery.  2.  Technically limited ultrasound with nonvisualization of  several arteries, as described    Electronically signed by:  Marie Bello MD  3/28/2017 6:16 PM CDT  [facility]                              Wound Culture   Date Value Ref Range Status   03/27/2017 Moderate growth (3+) Proteus mirabilis (A)  Final        Medication Review:   Current Facility-Administered Medications   Medication Dose Route Frequency Provider Last Rate Last Dose   • albuterol (PROVENTIL) nebulizer solution 0.083% 2.5 mg/3mL  2.5 mg Nebulization Q6H PRN TAMIKO Garner       • aspirin EC tablet 81 mg  81 mg Oral Daily Rima Keating MD   81 mg at 04/03/17 0807   • atorvastatin (LIPITOR) tablet 40 mg  40 mg Oral Nightly TAMIKO Garner   40 mg at 04/02/17 2116   • bupivacaine (PF) (MARCAINE) 0.5 % injection    PRN Narciso Maza DPM   18 mL at 03/29/17 1056   • citalopram (CeleXA) tablet 40 mg  40 mg Oral Daily TAMIKO Garner   40 mg at 04/03/17 0807   • dextrose (D50W) solution 25 g  25 g Intravenous Q15 Min PRN Rk Marshall MD       • dextrose (GLUTOSE) oral gel 15 g  15 g Oral Q15 Min PRN Rk Marshall MD       • docusate sodium (COLACE) capsule 100 mg  100 mg Oral BID PRN Rk Marshall MD       • glucagon (human recombinant) (GLUCAGEN DIAGNOSTIC) injection 1 mg  1 mg Subcutaneous Q15 Min PRN Rk Marshall MD       • hydrALAZINE (APRESOLINE) injection 10 mg  10 mg Intravenous Q6H PRN TAMIKO Garner       • influenza vac split quad (FLUZONE QUADRIVALENT) IM  suspension 0.5 mL  0.5 mL Intramuscular During Hospitalization Rk Marshall MD       • insulin aspart (novoLOG) injection 0-14 Units  0-14 Units Subcutaneous 4x Daily AC & at Bedtime Rk Marshall MD   3 Units at 04/03/17 1658   • insulin detemir (LEVEMIR) injection 20 Units  20 Units Subcutaneous Nightly TAMIKO Garner   20 Units at 03/29/17 2038   • Morphine sulfate (PF) injection 2 mg  2 mg Intravenous Q3H PRN Rk Marshall MD       • nystatin (MYCOSTATIN) [id number] UNIT/GM cream   Topical PRN TAMIKO Garner       • nystatin (MYCOSTATIN) powder   Topical PRN TAMIKO Garner       • ondansetron (ZOFRAN) injection 4 mg  4 mg Intravenous Q6H PRN Rk Marshall MD       • Pharmacy Consult   Does not apply Continuous PRN Rk Marshall MD       • piperacillin-tazobactam (ZOSYN) in iso-osmotic dextrose IVPB 2.25 g (premix)  2.25 g Intravenous Q6H TAMIKO Garner 100 mL/hr at 04/03/17 0623 2.25 g at 04/03/17 1700   • pneumococcal polysaccharide 23-valent (PNEUMOVAX-23) vaccine 0.5 mL  0.5 mL Intramuscular During Hospitalization Rk Marshall MD       • sodium chloride 0.9 % flush 1-10 mL  1-10 mL Intravenous PRN Rk Marshall MD       • sodium chloride 0.9 % infusion  75 mL/hr Intravenous Continuous Rk Marshall MD 75 mL/hr at 04/03/17 0807 75 mL/hr at 04/03/17 0807         Assessment/Plan     Active Problems:    Foot ulcer          Plan:   1. Right foot ulcer complicated by osteomyelitis:s/p partial 1st ray amputation. continue IV antibiotics. Input by podiatrist noted. Will require long term antimicrobial therapy.   2. Type 2 Diabetes mellitus: stable. Continue current medications.  3. Peripheral arterial Disease: Stable.  4. History of CVA with contractures: Stable.  5. Likely discharge i 1 to 2 days with IV antibiotic and home health follow up.  6. Heplock IV.    Saroj Preston MD  04/03/17      EMR Dragon/Transcription disclaimer:   Much of this encounter note is an electronic  transcription/translation of spoken language to printed text. The electronic translation of spoken language may permit erroneous, or at times, nonsensical words or phrases to be inadvertently transcribed; Although I have reviewed the note for such errors, some may still exist.

## 2017-04-03 NOTE — PLAN OF CARE
Problem: Patient Care Overview (Adult)  Goal: Plan of Care Review  Outcome: Ongoing (interventions implemented as appropriate)    04/02/17 1635 04/02/17 2110 04/03/17 0447   Patient Care Overview   Progress no change --  --    Outcome Evaluation   Outcome Summary/Follow up Plan --  --  Pt rested well VS stable   Coping/Psychosocial Response Interventions   Plan Of Care Reviewed With --  patient --        Goal: Adult Individualization and Mutuality  Outcome: Ongoing (interventions implemented as appropriate)  Goal: Discharge Needs Assessment  Outcome: Ongoing (interventions implemented as appropriate)    Problem: Pressure Ulcer (Adult)  Goal: Signs and Symptoms of Listed Potential Problems Will be Absent or Manageable (Pressure Ulcer)  Outcome: Ongoing (interventions implemented as appropriate)    Problem: Fall Risk (Adult)  Goal: Absence of Falls  Outcome: Ongoing (interventions implemented as appropriate)

## 2017-04-03 NOTE — NURSING NOTE
Patient has a stage 2 pressure injury left lateral ankle. 100% granulation. Needs protection and offloading POA yes. Wound is healing.

## 2017-04-03 NOTE — PLAN OF CARE
Problem: Patient Care Overview (Adult)  Goal: Plan of Care Review  Outcome: Ongoing (interventions implemented as appropriate)    04/03/17 1510   Patient Care Overview   Progress no change   Outcome Evaluation   Outcome Summary/Follow up Plan pt's dressing changed per MD, appetite is very well today and BS have been good, awaiting placement   Coping/Psychosocial Response Interventions   Plan Of Care Reviewed With patient       Goal: Adult Individualization and Mutuality  Outcome: Ongoing (interventions implemented as appropriate)  Goal: Discharge Needs Assessment  Outcome: Ongoing (interventions implemented as appropriate)    Problem: Pressure Ulcer (Adult)  Goal: Signs and Symptoms of Listed Potential Problems Will be Absent or Manageable (Pressure Ulcer)  Outcome: Ongoing (interventions implemented as appropriate)    Problem: Fall Risk (Adult)  Goal: Absence of Falls  Outcome: Ongoing (interventions implemented as appropriate)

## 2017-04-04 LAB
GLUCOSE BLDC GLUCOMTR-MCNC: 102 MG/DL (ref 70–130)
GLUCOSE BLDC GLUCOMTR-MCNC: 136 MG/DL (ref 70–130)
GLUCOSE BLDC GLUCOMTR-MCNC: 98 MG/DL (ref 70–130)

## 2017-04-04 PROCEDURE — 82962 GLUCOSE BLOOD TEST: CPT

## 2017-04-04 PROCEDURE — 63710000001 INSULIN DETEMIR PER 5 UNITS: Performed by: PHYSICIAN ASSISTANT

## 2017-04-04 PROCEDURE — 25010000002 PIPERACILLIN SOD-TAZOBACTAM PER 1 G: Performed by: PHYSICIAN ASSISTANT

## 2017-04-04 RX ADMIN — TAZOBACTAM SODIUM AND PIPERACILLIN SODIUM 2.25 G: 250; 2 INJECTION, SOLUTION INTRAVENOUS at 23:10

## 2017-04-04 RX ADMIN — TAZOBACTAM SODIUM AND PIPERACILLIN SODIUM 2.25 G: 250; 2 INJECTION, SOLUTION INTRAVENOUS at 11:04

## 2017-04-04 RX ADMIN — TAZOBACTAM SODIUM AND PIPERACILLIN SODIUM 2.25 G: 250; 2 INJECTION, SOLUTION INTRAVENOUS at 17:02

## 2017-04-04 RX ADMIN — CITALOPRAM HYDROBROMIDE 40 MG: 40 TABLET ORAL at 08:50

## 2017-04-04 RX ADMIN — SODIUM CHLORIDE 75 ML/HR: 900 INJECTION, SOLUTION INTRAVENOUS at 14:07

## 2017-04-04 RX ADMIN — INSULIN DETEMIR 20 UNITS: 100 INJECTION, SOLUTION SUBCUTANEOUS at 20:35

## 2017-04-04 RX ADMIN — TAZOBACTAM SODIUM AND PIPERACILLIN SODIUM 2.25 G: 250; 2 INJECTION, SOLUTION INTRAVENOUS at 05:45

## 2017-04-04 RX ADMIN — ATORVASTATIN CALCIUM 40 MG: 40 TABLET, FILM COATED ORAL at 20:34

## 2017-04-04 NOTE — PLAN OF CARE
Problem: Patient Care Overview (Adult)  Goal: Plan of Care Review  Outcome: Ongoing (interventions implemented as appropriate)  Encourage intake of meals and Nepro.    04/03/17 6352   Patient Care Overview   Progress declining   Outcome Evaluation   Outcome Summary/Follow up Plan Intake 50% - 4x, 25% - 1x, 0% - 4x.   Coping/Psychosocial Response Interventions   Plan Of Care Reviewed With patient

## 2017-04-04 NOTE — PLAN OF CARE
Problem: Patient Care Overview (Adult)  Goal: Plan of Care Review  Outcome: Ongoing (interventions implemented as appropriate)  Goal: Adult Individualization and Mutuality  Outcome: Ongoing (interventions implemented as appropriate)  Goal: Discharge Needs Assessment  Outcome: Ongoing (interventions implemented as appropriate)    Problem: Pressure Ulcer (Adult)  Goal: Signs and Symptoms of Listed Potential Problems Will be Absent or Manageable (Pressure Ulcer)  Outcome: Ongoing (interventions implemented as appropriate)    Problem: Fall Risk (Adult)  Goal: Absence of Falls  Outcome: Ongoing (interventions implemented as appropriate)

## 2017-04-04 NOTE — PROGRESS NOTES
[facility] Medicine Services  INPATIENT PROGRESS NOTE     LOS: 8 days   Patient Care Team:  Amirah Archuleta MD as PCP - General (Family Medicine)    Chief Complaint: No new complaints.       Subjective     Interval History:     Patient Complaints: None    History taken from:     Review of Systems:    Review of Systems   Unchanged from yesterday.       Objective     Vital Signs  Temp:  [96.1 °F (35.6 °C)-98.4 °F (36.9 °C)] 98 °F (36.7 °C)  Heart Rate:  [61-79] 68  Resp:  [20] 20  BP: (112-155)/(58-89) 150/74    Physical Exam:   Physical Exam   Cardiovascular: Normal S1 and S2  Lungs: Clear.  Abdomen: Soft, non tender, bowel sounds are present.  Extremities: Ulcer right foot remains covered with dressing.        Results Review:       Results from last 7 days  Lab Units 04/03/17  0731 04/01/17  0610 03/31/17  0602 03/29/17  0623   SODIUM mmol/L 139 138 139 139   POTASSIUM mmol/L 4.0 3.9 4.7 4.6   CHLORIDE mmol/L 103 107 107 109   TOTAL CO2 mmol/L 27.0 24.0 24.0 20.0*   BUN mg/dL 20 19 16 24*   CREATININE mg/dL 1.27 1.28 1.57* 1.87*   GLUCOSE mg/dL 101* 99 99 86   CALCIUM mg/dL 8.3* 8.0* 8.2* 8.0*   BILIRUBIN mg/dL 0.5 0.5 0.6 0.5   ALK PHOS U/L 169* 168* 162* 184*   ALT (SGPT) U/L 41 38 46 37   AST (SGOT) U/L 60* 48 60* 37         Results from last 7 days  Lab Units 04/03/17  0731 04/01/17  0610 03/31/17  0602 03/29/17  0623   MAGNESIUM mg/dL 1.8 1.7 1.8 2.1         Results from last 7 days  Lab Units 04/03/17  0731 04/01/17  0610 03/31/17  0602 03/29/17  0623   WBC 10*3/mm3 8.73 9.58 9.50 7.21   HEMOGLOBIN g/dL 10.6* 10.5* 10.6* 11.6*   HEMATOCRIT % 32.9* 31.5* 31.9* 35.8*   PLATELETS 10*3/mm3 326 312 316 340       No results found for: CKTOTAL, CKMB, CKMBINDEX, TROPONINI, TROPONINT    CO2   Date Value Ref Range Status   04/03/2017 27.0 22.0 - 31.0 mmol/L Final              Imaging Results (last 7 days)     Procedure Component Value Units Date/Time    US Arterial Doppler  Lower Extremity Complete [[id number]] Collected:  03/28/17 1807     Updated:  03/28/17 1817    Narrative:       Patient Name:  MR. LUIS ARIAS  Patient ID:  [id number]   Ordering:  NATHEN TAYLOR  Attending:  WAYNE RODRIGUEZ  Referring:  NATHEN TAYLOR  ------------------------------------------------      DUPLEX ARTERIAL ULTRASOUND OF THE LOWER EXTREMITIES.    INDICATION FOR PROCEDURE:  71 years -year-old patient presents  for evaluation of nonhealing ulceration of the right foot..    TECHNIQUE:  Color Doppler and Doppler evaluation of bilateral  lower extremity arteries.    FINDINGS:                                                 RIGHT                            CFA:                                    50.8 cm/sec              Proximal SFA:                      75 cm/sec              Mid SFA:                              Occluded.          Distal SFA                            79.4 cm/sec             Profundus Femoris             Not visualized.  Popliteal                              Occluded.               Posterior tibial                     Occluded         Peroneal                              Not visualized.                                                        LEFT    CFA:                                    137.6 cm/sec   Proximal SFA:                      91 cm/sec     Mid SFA:                              175.4 cm/sec    Distal SFA                            36 cm/sec    Profundus Femoris             Not visualized.  Popliteal                               Occluded.  Posterior tibial                      44 cm/sec  Peroneal                               Not visualized.    Wave forms are primarily biphasic and monophasic      Impression:       CONCLUSION:     1.  Occlusion of several calf arteries as well as the mid right  superficial femoral artery.  2.  Technically limited ultrasound with nonvisualization of  several arteries, as described    Electronically signed by:  Marie Bello MD  3/28/2017 6:16 PM  CDT  Workstation: [facility]                                    Medication Review:   Current Facility-Administered Medications   Medication Dose Route Frequency Provider Last Rate Last Dose   • albuterol (PROVENTIL) nebulizer solution 0.083% 2.5 mg/3mL  2.5 mg Nebulization Q6H PRN TAMIKO Garner       • aspirin EC tablet 81 mg  81 mg Oral Daily Rima Keating MD   81 mg at 04/03/17 0807   • atorvastatin (LIPITOR) tablet 40 mg  40 mg Oral Nightly TAMIKO Garner   40 mg at 04/03/17 2052   • bupivacaine (PF) (MARCAINE) 0.5 % injection    PRN Narciso Maza DPM   18 mL at 03/29/17 1056   • citalopram (CeleXA) tablet 40 mg  40 mg Oral Daily TAMIKO Garner   40 mg at 04/04/17 0850   • dextrose (D50W) solution 25 g  25 g Intravenous Q15 Min PRN Rk Marshall MD       • dextrose (GLUTOSE) oral gel 15 g  15 g Oral Q15 Min PRN Rk Marshall MD       • docusate sodium (COLACE) capsule 100 mg  100 mg Oral BID PRN Rk Marshall MD       • glucagon (human recombinant) (GLUCAGEN DIAGNOSTIC) injection 1 mg  1 mg Subcutaneous Q15 Min PRN Rk Marshall MD       • hydrALAZINE (APRESOLINE) injection 10 mg  10 mg Intravenous Q6H PRN TAMIKO Garner       • influenza vac split quad (FLUZONE QUADRIVALENT) IM suspension 0.5 mL  0.5 mL Intramuscular During Hospitalization Rk Marshall MD       • insulin aspart (novoLOG) injection 0-14 Units  0-14 Units Subcutaneous 4x Daily AC & at Bedtime Rk Marshall MD   3 Units at 04/03/17 1658   • insulin detemir (LEVEMIR) injection 20 Units  20 Units Subcutaneous Nightly TAMIKO Garner   20 Units at 04/03/17 2053   • Morphine sulfate (PF) injection 2 mg  2 mg Intravenous Q3H PRN Rk Marshall MD       • nystatin (MYCOSTATIN) [id number] UNIT/GM cream   Topical PRN TAMIKO Garner       • nystatin (MYCOSTATIN) powder   Topical PRN TAMIKO Garner       • ondansetron (ZOFRAN) injection 4 mg  4 mg Intravenous Q6H PRN Rk Marshall MD       •  Pharmacy Consult   Does not apply Continuous PRN Rk Marshall MD       • piperacillin-tazobactam (ZOSYN) in iso-osmotic dextrose IVPB 2.25 g (premix)  2.25 g Intravenous Q6H TAMIKO Garner 0 mL/hr at 04/04/17 0641 2.25 g at 04/04/17 1104   • pneumococcal polysaccharide 23-valent (PNEUMOVAX-23) vaccine 0.5 mL  0.5 mL Intramuscular During Hospitalization Rk Marshall MD       • sodium chloride 0.9 % flush 1-10 mL  1-10 mL Intravenous PRN Rk Marshall MD       • sodium chloride 0.9 % infusion  75 mL/hr Intravenous Continuous Rk Marshall MD 75 mL/hr at 04/04/17 1407 75 mL/hr at 04/04/17 1407         Assessment/Plan     Active Problems:    Foot ulcer          PLAN:  1. 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  2. Diabetes Mellitus: Stable.  3. Transfer back to nursing home in .      Saroj Preston MD  04/04/17      EMR Dragon/Transcription disclaimer:   Much of this encounter note is an electronic transcription/translation of spoken language to printed text. The electronic translation of spoken language may permit erroneous, or at times, nonsensical words or phrases to be inadvertently transcribed; Although I have reviewed the note for such errors, some may still exist.

## 2017-04-04 NOTE — PLAN OF CARE
Problem: Patient Care Overview (Adult)  Goal: Plan of Care Review  Outcome: Ongoing (interventions implemented as appropriate)    04/04/17 1514   Patient Care Overview   Progress no change   Outcome Evaluation   Outcome Summary/Follow up Plan pt able to ask for bedpan more often and has been eating well   Coping/Psychosocial Response Interventions   Plan Of Care Reviewed With patient       Goal: Adult Individualization and Mutuality  Outcome: Ongoing (interventions implemented as appropriate)  Goal: Discharge Needs Assessment  Outcome: Ongoing (interventions implemented as appropriate)    Problem: Pressure Ulcer (Adult)  Goal: Signs and Symptoms of Listed Potential Problems Will be Absent or Manageable (Pressure Ulcer)  Outcome: Ongoing (interventions implemented as appropriate)

## 2017-04-05 VITALS
HEART RATE: 95 BPM | TEMPERATURE: 98.2 F | SYSTOLIC BLOOD PRESSURE: 170 MMHG | RESPIRATION RATE: 18 BRPM | DIASTOLIC BLOOD PRESSURE: 75 MMHG | WEIGHT: 220.02 LBS | HEIGHT: 64 IN | BODY MASS INDEX: 37.56 KG/M2 | OXYGEN SATURATION: 93 %

## 2017-04-05 LAB
GLUCOSE BLDC GLUCOMTR-MCNC: 100 MG/DL (ref 70–130)
GLUCOSE BLDC GLUCOMTR-MCNC: 104 MG/DL (ref 70–130)
GLUCOSE BLDC GLUCOMTR-MCNC: 130 MG/DL (ref 70–130)
GLUCOSE BLDC GLUCOMTR-MCNC: 139 MG/DL (ref 70–130)
GLUCOSE BLDC GLUCOMTR-MCNC: 95 MG/DL (ref 70–130)

## 2017-04-05 PROCEDURE — 25010000002 HYDRALAZINE PER 20 MG: Performed by: PHYSICIAN ASSISTANT

## 2017-04-05 PROCEDURE — 82962 GLUCOSE BLOOD TEST: CPT

## 2017-04-05 PROCEDURE — 25010000002 PIPERACILLIN SOD-TAZOBACTAM PER 1 G: Performed by: PHYSICIAN ASSISTANT

## 2017-04-05 RX ORDER — NYSTATIN 100000 U/G
CREAM TOPICAL AS NEEDED
Qty: 5 G | Refills: 0 | Status: SHIPPED | OUTPATIENT
Start: 2017-04-05

## 2017-04-05 RX ORDER — PSEUDOEPHEDRINE HCL 30 MG
100 TABLET ORAL 2 TIMES DAILY PRN
Qty: 30 CAPSULE | Refills: 1 | Status: SHIPPED | OUTPATIENT
Start: 2017-04-05 | End: 2017-05-05

## 2017-04-05 RX ORDER — ASPIRIN 81 MG/1
81 TABLET ORAL DAILY
Qty: 30 TABLET | Refills: 2 | Status: SHIPPED | OUTPATIENT
Start: 2017-04-05

## 2017-04-05 RX ADMIN — TAZOBACTAM SODIUM AND PIPERACILLIN SODIUM 2.25 G: 250; 2 INJECTION, SOLUTION INTRAVENOUS at 11:20

## 2017-04-05 RX ADMIN — CITALOPRAM HYDROBROMIDE 40 MG: 40 TABLET ORAL at 08:55

## 2017-04-05 RX ADMIN — TAZOBACTAM SODIUM AND PIPERACILLIN SODIUM 2.25 G: 250; 2 INJECTION, SOLUTION INTRAVENOUS at 05:21

## 2017-04-05 RX ADMIN — ASPIRIN 81 MG: 81 TABLET, COATED ORAL at 08:55

## 2017-04-05 RX ADMIN — SODIUM CHLORIDE 75 ML/HR: 900 INJECTION, SOLUTION INTRAVENOUS at 06:23

## 2017-04-05 RX ADMIN — HYDRALAZINE HYDROCHLORIDE 10 MG: 20 INJECTION INTRAMUSCULAR; INTRAVENOUS at 11:18

## 2017-04-05 NOTE — DISCHARGE PLACEMENT REQUEST
"Luis Del Castillo (71 y.o. Male)     Date of Birth Social Security Number Address Home Phone MRN    1945  [id number] [facility], [facility] [address] [phone] [id number]    Restorationist Marital Status          Lutheran        Admission Date Admission Type Admitting Provider Attending Provider Department, Room/Bed    3/27/17 Urgent Saroj Preston MD Echendu, Anthony W, MD [address], [facility]/1    Discharge Date Discharge Disposition Discharge Destination         Skilled Nursing Facility (DC - External)             Attending Provider: Saroj Preston MD     Allergies:  No Known Allergies    Isolation:  None   Infection:  None   Code Status:  FULL    Ht:  64.02\" (162.6 cm)   Wt:  220 lb 0.3 oz (99.8 kg)    Admission Cmt:  None   Principal Problem:  None                Active Insurance as of 3/27/2017     Primary Coverage     Payor Plan Insurance Group Employer/Plan Group    MEDICARE MEDICARE A & B      Payor Plan Address Payor Plan Phone Number Effective From Effective To    [address] [phone] 12/1/1982     [place] [address]       Subscriber Name Subscriber Birth Date Member ID       LUIS DEL CASTILLO 1945 [id number]           Secondary Coverage     Payor Plan Insurance Group Employer/Plan Group    [place]      Payor Plan Address Payor Plan Phone Number Effective From Effective To    [address] [phone] 2/2/2017     [place] [address]       Subscriber Name Subscriber Birth Date Member ID       LUIS DEL CASTILLO 1945 [id number]                 Emergency Contacts      (Rel.) Home Phone Work Phone Mobile Phone    Danisha Del Castillo (Spouse) -- -- [phone]            Vital Signs (last 24 hours)       04/04 0700  -  04/05 0659 04/05 0700  -  04/05 1117   Most Recent    Temp (°F) 97.1 -  98.4      96.6     96.6 (35.9)    Heart Rate 63 -  71      67     67    Resp 18 -  20      18     18    /61 -  156/78      " 161/71     161/71    SpO2 (%) 93 -  98      97     97          Hospital Medications (active)       Dose Frequency Start End    albuterol (PROVENTIL) nebulizer solution 0.083% 2.5 mg/3mL 2.5 mg Every 6 Hours PRN 3/27/2017     Sig - Route: Take 2.5 mg by nebulization Every 6 (Six) Hours As Needed for Wheezing or Shortness of Air. - Nebulization    Cosign for Ordering: Accepted by Rk Marshall MD on 3/27/2017 10:00 PM    aspirin EC tablet 81 mg 81 mg Daily 3/30/2017     Sig - Route: Take 1 tablet by mouth Daily. - Oral    atorvastatin (LIPITOR) tablet 40 mg 40 mg Nightly 3/27/2017     Sig - Route: Take 1 tablet by mouth Every Night. - Oral    Cosign for Ordering: Accepted by Rk Marshall MD on 3/27/2017 10:00 PM    bupivacaine (PF) (MARCAINE) 0.5 % injection  As Needed 3/29/2017     Sig: As Needed.    citalopram (CeleXA) tablet 40 mg 40 mg Daily 3/27/2017     Sig - Route: Take 1 tablet by mouth Daily. - Oral    Cosign for Ordering: Accepted by Rk Marshall MD on 3/27/2017 10:00 PM    dextrose (D50W) solution 25 g 25 g Every 15 Minutes PRN 3/27/2017     Sig - Route: Infuse 50 mL into a venous catheter Every 15 (Fifteen) Minutes As Needed for Low Blood Sugar (Blood Sugar Less Than 70, Patient Has IV Access - Unresponsive, NPO or Unable To Safely Swallow). - Intravenous    dextrose (GLUTOSE) oral gel 15 g 15 g Every 15 Minutes PRN 3/27/2017     Sig - Route: Take 15 g by mouth Every 15 (Fifteen) Minutes As Needed for Low Blood Sugar (Blood Sugar Less Than 70, Patient Alert, Is Not NPO & Can Safely Swallow). - Oral    docusate sodium (COLACE) capsule 100 mg 100 mg 2 Times Daily PRN 3/27/2017     Sig - Route: Take 1 capsule by mouth 2 (Two) Times a Day As Needed for Constipation. - Oral    glucagon (human recombinant) (GLUCAGEN DIAGNOSTIC) injection 1 mg 1 mg Every 15 Minutes PRN 3/27/2017     Sig - Route: Inject 1 mg under the skin Every 15 (Fifteen) Minutes As Needed (Blood Glucose Less Than 70 - Patient Without IV  Access - Unresponsive, NPO or Unable To Safely Swallow). - Subcutaneous    hydrALAZINE (APRESOLINE) injection 10 mg 10 mg Every 6 Hours PRN 3/27/2017     Sig - Route: Infuse 0.5 mL into a venous catheter Every 6 (Six) Hours As Needed for High Blood Pressure. - Intravenous    Cosign for Ordering: Accepted by Rk Marshall MD on 3/27/2017 10:00 PM    influenza vac split quad (FLUZONE QUADRIVALENT) IM suspension 0.5 mL 0.5 mL During Hospitalization 3/27/2017     Sig - Route: Inject 0.5 mL into the shoulder, thigh, or buttocks During Hospitalization for immunization. - Intramuscular    insulin aspart (novoLOG) injection 0-14 Units 0-14 Units 4 Times Daily Before Meals & Nightly 3/27/2017     Sig - Route: Inject 0-14 Units under the skin 4 (Four) Times a Day Before Meals & at Bedtime. - Subcutaneous    insulin detemir (LEVEMIR) injection 20 Units 20 Units Nightly 3/27/2017     Sig - Route: Inject 20 Units under the skin Every Night. - Subcutaneous    Cosign for Ordering: Accepted by Rk Marshall MD on 3/27/2017 10:00 PM    Morphine sulfate (PF) injection 2 mg 2 mg Every 3 Hours PRN 3/27/2017 4/6/2017    Sig - Route: Infuse 1 mL into a venous catheter Every 3 (Three) Hours As Needed for Severe Pain (7-10) (Hold for SBP <100). - Intravenous    nystatin (MYCOSTATIN) 153117 UNIT/GM cream  As Needed 3/27/2017     Sig - Route: Apply  topically As Needed (Excoriation). - Topical    Cosign for Ordering: Accepted by Rk Marshall MD on 3/27/2017 10:00 PM    nystatin (MYCOSTATIN) powder  As Needed 3/27/2017     Sig - Route: Apply  topically As Needed (May use cream or powder, whichever is most appropriate for patient at that time, apply to affected folds). - Topical    Cosign for Ordering: Accepted by Rk Marshall MD on 3/27/2017 10:00 PM    ondansetron (ZOFRAN) injection 4 mg 4 mg Every 6 Hours PRN 3/27/2017     Sig - Route: Infuse 2 mL into a venous catheter Every 6 (Six) Hours As Needed for Nausea or Vomiting. -  Intravenous    Pharmacy Consult  Continuous PRN 3/27/2017     Sig - Route: Continuous As Needed for Consult. - Does not apply    piperacillin-tazobactam (ZOSYN) in iso-osmotic dextrose IVPB 2.25 g (premix) 2.25 g Every 6 Hours 3/28/2017     Sig - Route: Infuse 50 mL into a venous catheter Every 6 (Six) Hours. - Intravenous    pneumococcal polysaccharide 23-valent (PNEUMOVAX-23) vaccine 0.5 mL 0.5 mL During Hospitalization 3/27/2017     Sig - Route: Inject 0.5 mL into the shoulder, thigh, or buttocks During Hospitalization for immunization. - Intramuscular    sodium chloride 0.9 % flush 1-10 mL 1-10 mL As Needed 3/27/2017     Sig - Route: Infuse 1-10 mL into a venous catheter As Needed for Line Care. - Intravenous    sodium chloride 0.9 % infusion 75 mL/hr Continuous 3/27/2017     Sig - Route: Infuse 75 mL/hr into a venous catheter Continuous. - Intravenous          Lab Results (last 24 hours)     Procedure Component Value Units Date/Time    POC Glucose Fingerstick [[id number]]  (Abnormal) Collected:  04/04/17 1104    Specimen:  Blood Updated:  04/04/17 1130     Glucose 136 (H) mg/dL       RN NotifiedMeter: [id number]: [id number] [facility]       POC Glucose Fingerstick [[id number]]  (Normal) Collected:  04/04/17 1657    Specimen:  Blood Updated:  04/04/17 1711     Glucose 102 mg/dL       Meter: [id number]: [id number] PARESH JALEN [facility]       POC Glucose Fingerstick [[id number]]  (Abnormal) Collected:  04/03/17 2051    Specimen:  Blood Updated:  04/05/17 0110     Glucose 139 (H) mg/dL       Meter: [id number]: [id number] BRUC APRIL       POC Glucose Fingerstick [[id number]]  (Normal) Collected:  04/04/17 2033    Specimen:  Blood Updated:  04/05/17 0824     Glucose 130 mg/dL       RN NotifiedMeter: [id number]: [id number] BRUC APRIL       POC Glucose Fingerstick [[id number]]  (Normal) Collected:  04/05/17 0854    Specimen:  Blood Updated:  04/05/17 0915     Glucose 100 mg/dL       Meter:  [id number]: [id number] MIRELA SCHULZ              Physician Progress Notes (last 24 hours) (Notes from 4/4/2017 11:18 AM through 4/5/2017 11:18 AM)      Saroj Preston MD at 4/4/2017  4:22 PM  Version 1 of 1                [facility] Medicine Services  INPATIENT PROGRESS NOTE     LOS: 8 days   Patient Care Team:  Amirah Archuleta MD as PCP - General (Family Medicine)    Chief Complaint: No new complaints.       Subjective     Interval History:     Patient Complaints: None    History taken from:     Review of Systems:    Review of Systems   Unchanged from yesterday.       Objective     Vital Signs  Temp:  [96.1 °F (35.6 °C)-98.4 °F (36.9 °C)] 98 °F (36.7 °C)  Heart Rate:  [61-79] 68  Resp:  [20] 20  BP: (112-155)/(58-89) 150/74    Physical Exam:   Physical Exam   Cardiovascular: Normal S1 and S2  Lungs: Clear.  Abdomen: Soft, non tender, bowel sounds are present.  Extremities: Ulcer right foot remains covered with dressing.        Results Review:       Results from last 7 days  Lab Units 04/03/17  0731 04/01/17  0610 03/31/17  0602 03/29/17  0623   SODIUM mmol/L 139 138 139 139   POTASSIUM mmol/L 4.0 3.9 4.7 4.6   CHLORIDE mmol/L 103 107 107 109   TOTAL CO2 mmol/L 27.0 24.0 24.0 20.0*   BUN mg/dL 20 19 16 24*   CREATININE mg/dL 1.27 1.28 1.57* 1.87*   GLUCOSE mg/dL 101* 99 99 86   CALCIUM mg/dL 8.3* 8.0* 8.2* 8.0*   BILIRUBIN mg/dL 0.5 0.5 0.6 0.5   ALK PHOS U/L 169* 168* 162* 184*   ALT (SGPT) U/L 41 38 46 37   AST (SGOT) U/L 60* 48 60* 37         Results from last 7 days  Lab Units 04/03/17  0731 04/01/17  0610 03/31/17  0602 03/29/17  0623   MAGNESIUM mg/dL 1.8 1.7 1.8 2.1         Results from last 7 days  Lab Units 04/03/17  0731 04/01/17  0610 03/31/17  0602 03/29/17  0623   WBC 10*3/mm3 8.73 9.58 9.50 7.21   HEMOGLOBIN g/dL 10.6* 10.5* 10.6* 11.6*   HEMATOCRIT % 32.9* 31.5* 31.9* 35.8*   PLATELETS 10*3/mm3 326 312 316 340       No results found for: CKTOTAL,  CKMB, CKMBINDEX, TROPONINI, TROPONINT    CO2   Date Value Ref Range Status   04/03/2017 27.0 22.0 - 31.0 mmol/L Final              Imaging Results (last 7 days)     Procedure Component Value Units Date/Time    US Arterial Doppler Lower Extremity Complete [[id number]] Collected:  03/28/17 1807     Updated:  03/28/17 1817    Narrative:       Patient Name:  MR. LUIS ARIAS  Patient ID:  [id number]   Ordering:  NATHEN TAYLOR  Attending:  WAYNE RODRIGUEZ  Referring:  NATHEN TAYLOR  ------------------------------------------------      DUPLEX ARTERIAL ULTRASOUND OF THE LOWER EXTREMITIES.    INDICATION FOR PROCEDURE:  71 years -year-old patient presents  for evaluation of nonhealing ulceration of the right foot..    TECHNIQUE:  Color Doppler and Doppler evaluation of bilateral  lower extremity arteries.    FINDINGS:                                                 RIGHT                            CFA:                                    50.8 cm/sec              Proximal SFA:                      75 cm/sec              Mid SFA:                              Occluded.          Distal SFA                            79.4 cm/sec             Profundus Femoris             Not visualized.  Popliteal                              Occluded.               Posterior tibial                     Occluded         Peroneal                              Not visualized.                                                        LEFT    CFA:                                    137.6 cm/sec   Proximal SFA:                      91 cm/sec     Mid SFA:                              175.4 cm/sec    Distal SFA                            36 cm/sec    Profundus Femoris             Not visualized.  Popliteal                               Occluded.  Posterior tibial                      44 cm/sec  Peroneal                               Not visualized.    Wave forms are primarily biphasic and monophasic      Impression:       CONCLUSION:     1.  Occlusion of several  calf arteries as well as the mid right  superficial femoral artery.  2.  Technically limited ultrasound with nonvisualization of  several arteries, as described    Electronically signed by:  Marie Bello MD  3/28/2017 6:16 PM CDT  Workstation: [id number]                                    Medication Review:   Current Facility-Administered Medications   Medication Dose Route Frequency Provider Last Rate Last Dose   • albuterol (PROVENTIL) nebulizer solution 0.083% 2.5 mg/3mL  2.5 mg Nebulization Q6H PRN TAMIKO Garner       • aspirin EC tablet 81 mg  81 mg Oral Daily Rima Keating MD   81 mg at 04/03/17 0807   • atorvastatin (LIPITOR) tablet 40 mg  40 mg Oral Nightly TAMIKO Garner   40 mg at 04/03/17 2052   • bupivacaine (PF) (MARCAINE) 0.5 % injection    PRN Narciso Maza DPM   18 mL at 03/29/17 1056   • citalopram (CeleXA) tablet 40 mg  40 mg Oral Daily TAMIKO Garner   40 mg at 04/04/17 0850   • dextrose (D50W) solution 25 g  25 g Intravenous Q15 Min PRN Rk Marshall MD       • dextrose (GLUTOSE) oral gel 15 g  15 g Oral Q15 Min PRN Rk Marshall MD       • docusate sodium (COLACE) capsule 100 mg  100 mg Oral BID PRN Rk Marshall MD       • glucagon (human recombinant) (GLUCAGEN DIAGNOSTIC) injection 1 mg  1 mg Subcutaneous Q15 Min PRN Rk Marshall MD       • hydrALAZINE (APRESOLINE) injection 10 mg  10 mg Intravenous Q6H PRN TAMIKO Garner       • influenza vac split quad (FLUZONE QUADRIVALENT) IM suspension 0.5 mL  0.5 mL Intramuscular During Hospitalization Rk Marshall MD       • insulin aspart (novoLOG) injection 0-14 Units  0-14 Units Subcutaneous 4x Daily AC & at Bedtime Rk Marshall MD   3 Units at 04/03/17 1658   • insulin detemir (LEVEMIR) injection 20 Units  20 Units Subcutaneous Nightly TAMIKO Garner   20 Units at 04/03/17 2053   • Morphine sulfate (PF) injection 2 mg  2 mg Intravenous Q3H PRN Rk Marshall MD       • nystatin (MYCOSTATIN) 207935  UNIT/GM cream   Topical PRN TAMIKO Garner       • nystatin (MYCOSTATIN) powder   Topical PRN TAMIKO Garner       • ondansetron (ZOFRAN) injection 4 mg  4 mg Intravenous Q6H PRN Rk Marshall MD       • Pharmacy Consult   Does not apply Continuous PRN Rk Marshall MD       • piperacillin-tazobactam (ZOSYN) in iso-osmotic dextrose IVPB 2.25 g (premix)  2.25 g Intravenous Q6H TAMIKO Garner 0 mL/hr at 04/04/17 0641 2.25 g at 04/04/17 1104   • pneumococcal polysaccharide 23-valent (PNEUMOVAX-23) vaccine 0.5 mL  0.5 mL Intramuscular During Hospitalization Rk Marshall MD       • sodium chloride 0.9 % flush 1-10 mL  1-10 mL Intravenous PRN Rk Marshall MD       • sodium chloride 0.9 % infusion  75 mL/hr Intravenous Continuous Rk Marshall MD 75 mL/hr at 04/04/17 1407 75 mL/hr at 04/04/17 1407         Assessment/Plan     Active Problems:    Foot ulcer          PLAN:  1. 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  2. Diabetes Mellitus: Stable.  3. Transfer back to nursing home in am.      Saroj Preston MD  04/04/17      EMR Dragon/Transcription disclaimer:   Much of this encounter note is an electronic transcription/translation of spoken language to printed text. The electronic translation of spoken language may permit erroneous, or at times, nonsensical words or phrases to be inadvertently transcribed; Although I have reviewed the note for such errors, some may still exist.                 Electronically signed by Saroj Preston MD at 4/4/2017  4:30 PM      Saroj Preston MD at 4/5/2017 10:27 AM  Version 1 of 1                [facility] Medicine Services  INPATIENT PROGRESS NOTE     LOS: 9 days   Patient Care Team:  Amirah Archuleta MD as PCP - General (Family Medicine)    Chief Complaint: No new complaints.       Subjective     Interval History:     Patient Complaints:      History taken from:     Review of Systems:    Review of Systems   No changes from yesterday.      Objective     Vital Signs  Temp:  [96.6 °F (35.9 °C)-98.3 °F (36.8 °C)] 96.6 °F (35.9 °C)  Heart Rate:  [63-71] 67  Resp:  [18-20] 18  BP: (105-161)/(61-82) 161/71   Bp repeated manually and better.    Physical Exam:   Physical Exam   Systemic exam is unremarkable.  Right foot remains covered with dressing.       Results Review:       Results from last 7 days  Lab Units 04/03/17  0731 04/01/17  0610 03/31/17  0602   SODIUM mmol/L 139 138 139   POTASSIUM mmol/L 4.0 3.9 4.7   CHLORIDE mmol/L 103 107 107   TOTAL CO2 mmol/L 27.0 24.0 24.0   BUN mg/dL 20 19 16   CREATININE mg/dL 1.27 1.28 1.57*   GLUCOSE mg/dL 101* 99 99   CALCIUM mg/dL 8.3* 8.0* 8.2*   BILIRUBIN mg/dL 0.5 0.5 0.6   ALK PHOS U/L 169* 168* 162*   ALT (SGPT) U/L 41 38 46   AST (SGOT) U/L 60* 48 60*         Results from last 7 days  Lab Units 04/03/17  0731 04/01/17  0610 03/31/17  0602   MAGNESIUM mg/dL 1.8 1.7 1.8         Results from last 7 days  Lab Units 04/03/17  0731 04/01/17  0610 03/31/17  0602   WBC 10*3/mm3 8.73 9.58 9.50   HEMOGLOBIN g/dL 10.6* 10.5* 10.6*   HEMATOCRIT % 32.9* 31.5* 31.9*   PLATELETS 10*3/mm3 326 312 316       No results found for: CKTOTAL, CKMB, CKMBINDEX, TROPONINI, TROPONINT    CO2   Date Value Ref Range Status   04/03/2017 27.0 22.0 - 31.0 mmol/L Final              Imaging Results (last 7 days)     ** No results found for the last 168 hours. **                                    Medication Review:   Current Facility-Administered Medications   Medication Dose Route Frequency Provider Last Rate Last Dose   • albuterol (PROVENTIL) nebulizer solution 0.083% 2.5 mg/3mL  2.5 mg Nebulization Q6H PRN TAMIKO Garner       • aspirin EC tablet 81 mg  81 mg Oral Daily Rima Keating MD   81 mg at 04/05/17 0855   • atorvastatin (LIPITOR) tablet 40 mg  40 mg Oral Nightly TAMIKO Garner   40 mg at 04/04/17 2034   •  bupivacaine (PF) (MARCAINE) 0.5 % injection    PRN Narciso Maza DPM   18 mL at 03/29/17 1056   • citalopram (CeleXA) tablet 40 mg  40 mg Oral Daily TAMIKO Garner   40 mg at 04/05/17 0855   • dextrose (D50W) solution 25 g  25 g Intravenous Q15 Min PRN Rk Marshall MD       • dextrose (GLUTOSE) oral gel 15 g  15 g Oral Q15 Min PRN Rk Marshall MD       • docusate sodium (COLACE) capsule 100 mg  100 mg Oral BID PRN Rk Marshall MD       • glucagon (human recombinant) (GLUCAGEN DIAGNOSTIC) injection 1 mg  1 mg Subcutaneous Q15 Min PRN Rk Marshall MD       • hydrALAZINE (APRESOLINE) injection 10 mg  10 mg Intravenous Q6H PRN TAMIKO Garner       • influenza vac split quad (FLUZONE QUADRIVALENT) IM suspension 0.5 mL  0.5 mL Intramuscular During Hospitalization Rk Marshall MD       • insulin aspart (novoLOG) injection 0-14 Units  0-14 Units Subcutaneous 4x Daily AC & at Bedtime Rk Marshall MD   3 Units at 04/03/17 1658   • insulin detemir (LEVEMIR) injection 20 Units  20 Units Subcutaneous Nightly TAMIKO Garner   20 Units at 04/04/17 2035   • Morphine sulfate (PF) injection 2 mg  2 mg Intravenous Q3H PRN Rk Marshall MD       • nystatin (MYCOSTATIN) 926314 UNIT/GM cream   Topical PRN TAMIKO Garner       • nystatin (MYCOSTATIN) powder   Topical PRN TAMIKO Garner       • ondansetron (ZOFRAN) injection 4 mg  4 mg Intravenous Q6H PRN Rk Marshall MD       • Pharmacy Consult   Does not apply Continuous PRN Rk Marshall MD       • piperacillin-tazobactam (ZOSYN) in iso-osmotic dextrose IVPB 2.25 g (premix)  2.25 g Intravenous Q6H TAMIKO Garner   Stopped at 04/05/17 0620   • pneumococcal polysaccharide 23-valent (PNEUMOVAX-23) vaccine 0.5 mL  0.5 mL Intramuscular During Hospitalization Rk Marshall MD       • sodium chloride 0.9 % flush 1-10 mL  1-10 mL Intravenous PRN Rk Marshall MD       • sodium chloride 0.9 % infusion  75 mL/hr Intravenous Continuous  Rk Marshall MD 75 mL/hr at 04/05/17 0623 75 mL/hr at 04/05/17 0623         Assessment/Plan     Active Problems:    Foot ulcer          Plan: Patient remains clinically stable and will be transferred to NH today.  Will receive up to 4 weeks of IV antibiotics.      Saroj Preston MD  04/05/17      EMR Dragon/Transcription disclaimer:   Much of this encounter note is an electronic transcription/translation of spoken language to printed text. The electronic translation of spoken language may permit erroneous, or at times, nonsensical words or phrases to be inadvertently transcribed; Although I have reviewed the note for such errors, some may still exist.                 Electronically signed by Saroj Preston MD at 4/5/2017 10:31 AM        Consult Notes (last 24 hours) (Notes from 4/4/2017 11:18 AM through 4/5/2017 11:18 AM)     No notes of this type exist for this encounter.

## 2017-04-05 NOTE — CONSULTS
"Adult Nutrition  Assessment    Patient Name:  Josiah Del Castillo  YOB: 1945  MRN: [id number]  Admit Date:  3/27/2017    Assessment Date:  4/5/2017          Reason for Assessment       04/05/17 1213    Reason for Assessment    Reason For Assessment/Visit follow up protocol                Anthropometrics       04/05/17 1213    Anthropometrics    Height 162.6 cm (64.02\")    Weight 99.8 kg (220 lb 0.3 oz)    Ideal Body Weight (IBW)    Ideal Body Weight (IBW), Male (kg) 59.76    % Ideal Body Weight 167.35    Body Mass Index (BMI)    BMI (kg/m2) 37.83    BMI Grade 35 - 39.9 - obesity - grade II            Labs/Tests/Procedures/Meds       04/05/17 1213    Labs/Tests/Procedures/Meds    Labs/Tests Review Glucose    Medication Review Insulin;Reviewed, pertinent            Physical Findings       04/05/17 1214    Physical Appearance    Skin other (see comments)   stage 2 pressure ulcer left lateral ankle, wound bilateral medial groin, abrasion bilateral medial scrotum, wound left lateral hip, wound right medial foot, incision right foot              Nutrition Prescription Ordered       04/05/17 1215    Nutrition Prescription PO    Current PO Diet Regular    Supplement Nepro    Supplement Frequency 3 times a day    Common Modifiers Cardiac;Consistent Carbohydrate;Renal            Evaluation of Received Nutrient/Fluid Intake       04/05/17 1216    PO Evaluation    Number of Meals 11    % PO Intake 100% - 4x, 75% - 2x, 50% - 1x, 25% - 1x, 0% - 3x            Comments:  Pt reports good appetite.  Is taking the Nepro he receives.  Intake 100% - 4x, 75% - 2x, 50% - 1x, 25% - 1x, 0% - 3x.  Blood glucose is usually elevated.  Levemir, sliding scale novolog prescribed.          Electronically signed by:  Lesa Thrasher RD  04/05/17 12:18 PM  "

## 2017-04-05 NOTE — DISCHARGE SUMMARY
[facility] Medicine Services  DISCHARGE SUMMARY       Date of Admission: 3/27/2017  Date of Discharge:  4/5/2017  Primary Care Physician: Amirah Archuleta MD    Presenting Problem/History of Present Illness:  71-year-old male with history of type 2 diabetes mellitus hypertension, dyslipidemia, depression, peripheral artery disease, previous CVA] completed dictated by contract] closed and paroxysmal atrial fibrillation who was seen due to ulcer on the right foot.      On telemetry and she was noted to have stable vital signs and was assessed to be hemodynamically stable.  The cardiovascular examination revealed normal S1 and S2.  Lungs: Good air entry bilaterally.  Abdomen: Soft with normal bowel sounds.  Extremities: There was some contractures arising from previous stroke.  There is an open ulcer on the first right toe.         Final Discharge Diagnoses:  Hospital Problem List     Foot ulcer          Consults:   Consults     Date and Time Order Name Status Description    3/27/2017 1706 Inpatient Consult to Podiatry Completed           Procedures Performed: Procedure(s):  RIGHT FOOT PARTIAL 1ST RAY AMPUTATION AND ALL OTHER INDICATED PROCEDURES                Pertinent Test Results:   1.  Arterial duplex ultrasound showed occlusion of several arteries as well as need right superficial femoral artery.  2.  CT scan right ankle and foot without contrast showed open ulceration adjacent to the first MTP joint with evidence of infection and osteomyelitis involving the distal first metatarsal and the base of the first proximal phalanx.    Chief Complaint on Day of Discharge:     Hospital Course:  The patient was admitted and, and started on broad  spectrum antibiotics and his routine outpatient medications.  He was seen by the podiatrist on consult and had partial  1st ray amputation.  Wound dressing was subsequently followed.  The patient remained mostly hemodynamically stable and  "was cleared by the podiatrist to be discharged back to the nursing home.  He will continue long term IV antibiotics therapy for at least 4-6 weeks.      Condition on Discharge:  Stable.    Physical Exam on Discharge:  /71 (BP Location: Left arm, Patient Position: Lying)  Pulse 67  Temp 96.6 °F (35.9 °C) (Oral)   Resp 18  Ht 64.02\" (162.6 cm)  Wt 220 lb 0.3 oz (99.8 kg)  SpO2 97%  BMI 37.75 kg/m2  Physical Exam  CV: Normal S1 and S2  Lungs: Clear  Extremeties: Right foot covered with dressing.     Discharge Disposition:  Skilled Nursing Facility (DC - External)    Discharge Medications:   Josiah Del Castillo   Home Medication Instructions MARY:[id number]    Printed on:04/05/17 1032   Medication Information                      albuterol (PROVENTIL) (2.5 MG/3ML) 0.083% nebulizer solution  Take 2.5 mg by nebulization Every 6 (Six) Hours As Needed.             aspirin 81 MG EC tablet  Take 1 tablet by mouth Daily.             atorvastatin (LIPITOR) 40 MG tablet  Take 1 tablet by mouth Daily.             citalopram (CeleXA) 40 MG tablet  TAKE 1 TABLET BY MOUTH DAILY **NEEDS LAB WORK**             Cyanocobalamin-Methylcobalamin 600-600 MCG sublingual tablet  Place 1 tablet under the tongue Daily.             docusate sodium 100 MG capsule  Take 100 mg by mouth 2 (Two) Times a Day As Needed for Constipation for up to 30 days.             glucose blood (SOLUS V2 TEST) test strip  Use one strip to test blood sugar 2 times every day for diabetes             Insulin Pen Needle 30G X 8 MM misc  1 each Daily.             LANTUS SOLOSTAR 100 UNIT/ML injection pen  INJECT 20 UNITS SUBCUTANEOUSLY EVERY NIGHT AT BEDTIME             nystatin (MYCOSTATIN) 672554 UNIT/GM cream  Apply  topically As Needed (Excoriation).             PHARMACY CONSULT  Continuous As Needed (monitor due to renal function.).             piperacillin-tazobactam (ZOSYN) 2-0.25 GM/50ML IVPB  Infuse 50 mL into a venous catheter Every 6 (Six) Hours " for 28 days. Indications: Skin and Soft Tissue Infection             TRUETEST TEST test strip  CHECK BLOOD SUGAR TWICE DAILY             valsartan (DIOVAN) 320 MG tablet  TAKE 1 TABLET BY MOUTH DAILY             warfarin (COUMADIN) 5 MG tablet  Take  by mouth. 1 TABLET EVERY PM                 Discharge Diet: Heart healthy/diabetic    Activity at Discharge:     Discharge Care Plan/Instructions: Done.    Follow-up Appointments:   No future appointments.    Test Results Pending at Discharge:    Order Current Status    POC Glucose Fingerstick In process          Saroj Preston MD  04/05/17  10:32 AM    Time: 10.50

## 2017-04-05 NOTE — PROGRESS NOTES
[facility] Medicine Services  INPATIENT PROGRESS NOTE     LOS: 9 days   Patient Care Team:  Amirah Archuleta MD as PCP - General (Family Medicine)    Chief Complaint: No new complaints.       Subjective     Interval History:     Patient Complaints:     History taken from:     Review of Systems:    Review of Systems   No changes from yesterday.      Objective     Vital Signs  Temp:  [96.6 °F (35.9 °C)-98.3 °F (36.8 °C)] 96.6 °F (35.9 °C)  Heart Rate:  [63-71] 67  Resp:  [18-20] 18  BP: (105-161)/(61-82) 161/71   Bp repeated manually and better.    Physical Exam:   Physical Exam   Systemic exam is unremarkable.  Right foot remains covered with dressing.       Results Review:       Results from last 7 days  Lab Units 04/03/17  0731 04/01/17  0610 03/31/17  0602   SODIUM mmol/L 139 138 139   POTASSIUM mmol/L 4.0 3.9 4.7   CHLORIDE mmol/L 103 107 107   TOTAL CO2 mmol/L 27.0 24.0 24.0   BUN mg/dL 20 19 16   CREATININE mg/dL 1.27 1.28 1.57*   GLUCOSE mg/dL 101* 99 99   CALCIUM mg/dL 8.3* 8.0* 8.2*   BILIRUBIN mg/dL 0.5 0.5 0.6   ALK PHOS U/L 169* 168* 162*   ALT (SGPT) U/L 41 38 46   AST (SGOT) U/L 60* 48 60*         Results from last 7 days  Lab Units 04/03/17  0731 04/01/17  0610 03/31/17  0602   MAGNESIUM mg/dL 1.8 1.7 1.8         Results from last 7 days  Lab Units 04/03/17  0731 04/01/17  0610 03/31/17  0602   WBC 10*3/mm3 8.73 9.58 9.50   HEMOGLOBIN g/dL 10.6* 10.5* 10.6*   HEMATOCRIT % 32.9* 31.5* 31.9*   PLATELETS 10*3/mm3 326 312 316       No results found for: CKTOTAL, CKMB, CKMBINDEX, TROPONINI, TROPONINT    CO2   Date Value Ref Range Status   04/03/2017 27.0 22.0 - 31.0 mmol/L Final              Imaging Results (last 7 days)     ** No results found for the last 168 hours. **                                    Medication Review:   Current Facility-Administered Medications   Medication Dose Route Frequency Provider Last Rate Last Dose   • albuterol (PROVENTIL)  nebulizer solution 0.083% 2.5 mg/3mL  2.5 mg Nebulization Q6H PRN TAMIKO Garner       • aspirin EC tablet 81 mg  81 mg Oral Daily Rima Keating MD   81 mg at 04/05/17 0855   • atorvastatin (LIPITOR) tablet 40 mg  40 mg Oral Nightly TAMIKO Garner   40 mg at 04/04/17 2034   • bupivacaine (PF) (MARCAINE) 0.5 % injection    PRN Narciso Maza DPM   18 mL at 03/29/17 1056   • citalopram (CeleXA) tablet 40 mg  40 mg Oral Daily TAMIKO Garner   40 mg at 04/05/17 0855   • dextrose (D50W) solution 25 g  25 g Intravenous Q15 Min PRN Rk Marshall MD       • dextrose (GLUTOSE) oral gel 15 g  15 g Oral Q15 Min PRN Rk Marshall MD       • docusate sodium (COLACE) capsule 100 mg  100 mg Oral BID PRN Rk Marshall MD       • glucagon (human recombinant) (GLUCAGEN DIAGNOSTIC) injection 1 mg  1 mg Subcutaneous Q15 Min PRN Rk Marshall MD       • hydrALAZINE (APRESOLINE) injection 10 mg  10 mg Intravenous Q6H PRN TAMIKO Garner       • influenza vac split quad (FLUZONE QUADRIVALENT) IM suspension 0.5 mL  0.5 mL Intramuscular During Hospitalization Rk Marshall MD       • insulin aspart (novoLOG) injection 0-14 Units  0-14 Units Subcutaneous 4x Daily AC & at Bedtime Rk Marshall MD   3 Units at 04/03/17 1658   • insulin detemir (LEVEMIR) injection 20 Units  20 Units Subcutaneous Nightly TAMIKO Garner   20 Units at 04/04/17 2035   • Morphine sulfate (PF) injection 2 mg  2 mg Intravenous Q3H PRN Rk Marshall MD       • nystatin (MYCOSTATIN) 467941 UNIT/GM cream   Topical PRN TAMIKO Garner       • nystatin (MYCOSTATIN) powder   Topical PRN TAMIKO Garner       • ondansetron (ZOFRAN) injection 4 mg  4 mg Intravenous Q6H PRN Rk Marshall MD       • Pharmacy Consult   Does not apply Continuous PRN Rk Marshall MD       • piperacillin-tazobactam (ZOSYN) in iso-osmotic dextrose IVPB 2.25 g (premix)  2.25 g Intravenous Q6H TAMIKO Garner   Stopped at 04/05/17 0620    • pneumococcal polysaccharide 23-valent (PNEUMOVAX-23) vaccine 0.5 mL  0.5 mL Intramuscular During Hospitalization Rk Marshall MD       • sodium chloride 0.9 % flush 1-10 mL  1-10 mL Intravenous PRN Rk Marshall MD       • sodium chloride 0.9 % infusion  75 mL/hr Intravenous Continuous Rk Marshall MD 75 mL/hr at 04/05/17 0623 75 mL/hr at 04/05/17 0623         Assessment/Plan     Active Problems:    Foot ulcer          Plan: Patient remains clinically stable and will be transferred to NH today.  Will receive up to 4 weeks of IV antibiotics.      Saroj Preston MD  04/05/17      EMR Dragon/Transcription disclaimer:   Much of this encounter note is an electronic transcription/translation of spoken language to printed text. The electronic translation of spoken language may permit erroneous, or at times, nonsensical words or phrases to be inadvertently transcribed; Although I have reviewed the note for such errors, some may still exist.

## 2017-04-05 NOTE — PLAN OF CARE
Problem: Patient Care Overview (Adult)  Goal: Plan of Care Review  Outcome: Ongoing (interventions implemented as appropriate)  Encourage intake of meals and Nepro.    04/05/17 7027   Patient Care Overview   Progress improving   Outcome Evaluation   Outcome Summary/Follow up Plan Intake 100% - 4x, 75% - 2x, 50% - 1x, 25% - 1x, 0% - 3x   Coping/Psychosocial Response Interventions   Plan Of Care Reviewed With patient

## 2017-04-13 ENCOUNTER — OFFICE VISIT (OUTPATIENT)
Dept: PODIATRY | Facility: CLINIC | Age: 72
End: 2017-04-13

## 2017-04-13 VITALS — BODY MASS INDEX: 37.56 KG/M2 | WEIGHT: 220 LBS | HEIGHT: 64 IN

## 2017-04-13 DIAGNOSIS — Z89.421 TOE AMPUTATION STATUS, RIGHT: ICD-10-CM

## 2017-04-13 DIAGNOSIS — E11.42 DIABETIC POLYNEUROPATHY ASSOCIATED WITH TYPE 2 DIABETES MELLITUS (HCC): Primary | ICD-10-CM

## 2017-04-13 DIAGNOSIS — M86.671 CHRONIC OSTEOMYELITIS OF RIGHT FOOT (HCC): ICD-10-CM

## 2017-04-13 PROCEDURE — 99024 POSTOP FOLLOW-UP VISIT: CPT | Performed by: PODIATRIST

## 2017-04-13 NOTE — PROGRESS NOTES
Josiah Del Castillo  1945  71 y.o. male   PCP: Amirah Archuleta MD  Patient presents today s/p right partial 1st ray amputation    04/13/2017    Chief Complaint   Patient presents with   • Right Foot - post op recheck           History of Present Illness    Mr Del Castillo is a 71-year-old male with history of diabetes and CVA with subsequent lower extremity contractures.  He is s/p right partial 1st ray amputation on 3/29/17. Doing well. Now in nursing home who are providing dressing changes.    Past Medical History:   Diagnosis Date   • Abscess of scalp    • Acute bronchitis    • Carotid artery stenosis     Athrosclerosis complete right ICA 70% left ICA   • Cellulitis of head (any part, except face)     Left ear     • Cellulitis of scalp    • Cerebrovascular accident    • Dizziness and giddiness    • Essential hypertension    • Hemiplegia     (L)      • History of respiratory therapy 06/19/2013    Nebulizer Treatment 55366 (1)  - RNatacha Negro   • Hyperlipidemia    • Hypertensive disorder    • Impacted cerumen    • Impaired glucose tolerance associated with insulin receptor abnormality    • Malignant neoplasm of prostate     Screening for malignant neoplasm of prostate    • 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    • Paronychia of toe    • Tinea cruris    • Transient cerebral ischemia    • Type 2 diabetes mellitus          Past Surgical History:   Procedure Laterality Date   • CORONARY ARTERY BYPASS GRAFT  2000     x 3    • HERNIA REPAIR      Age 11   • OTHER SURGICAL HISTORY  06/19/2013    Remove Impacted Cerumen 75036 (1)  - RNatacha Negro   • TRANS METATARSAL AMPUTATION Right 3/29/2017    Procedure: RIGHT FOOT PARTIAL 1ST RAY AMPUTATION AND ALL OTHER INDICATED PROCEDURES;  Surgeon: Narciso Maza DPM;  Location: [facility];  Service:          Family History   Problem Relation Age of Onset   • Diabetes Other    • Heart disease Other          Social History     Social History   • Marital status:       Spouse name: N/A   • Number of children: N/A   • Years of education: N/A     Occupational History   • Not on file.     Social History Main Topics   • Smoking status: Former Smoker   • Smokeless tobacco: Not on file   • Alcohol use No   • Drug use: No   • Sexual activity: Defer     Other Topics Concern   • Not on file     Social History Narrative         Current Outpatient Prescriptions   Medication Sig Dispense Refill   • albuterol (PROVENTIL) (2.5 MG/3ML) 0.083% nebulizer solution Take 2.5 mg by nebulization Every 6 (Six) Hours As Needed.     • aspirin 81 MG EC tablet Take 1 tablet by mouth Daily. 30 tablet 2   • atorvastatin (LIPITOR) 40 MG tablet Take 1 tablet by mouth Daily. 30 tablet 5   • citalopram (CeleXA) 40 MG tablet TAKE 1 TABLET BY MOUTH DAILY **NEEDS LAB WORK** 30 tablet 0   • Cyanocobalamin-Methylcobalamin 600-600 MCG sublingual tablet Place 1 tablet under the tongue Daily.     • docusate sodium 100 MG capsule Take 100 mg by mouth 2 (Two) Times a Day As Needed for Constipation for up to 30 days. 30 capsule 1   • glucose blood (SOLUS V2 TEST) test strip Use one strip to test blood sugar 2 times every day for diabetes     • Insulin Pen Needle 30G X 8 MM misc 1 each Daily. 30 each 5   • LANTUS SOLOSTAR 100 UNIT/ML injection pen INJECT 20 UNITS SUBCUTANEOUSLY EVERY NIGHT AT BEDTIME 15 mL PRN   • nystatin (MYCOSTATIN) 085778 UNIT/GM cream Apply  topically As Needed (Excoriation). 5 g 0   • PHARMACY CONSULT Continuous As Needed (monitor due to renal function.). 1 each 0   • piperacillin-tazobactam (ZOSYN) 2-0.25 GM/50ML IVPB Infuse 50 mL into a venous catheter Every 6 (Six) Hours for 28 days. Indications: Skin and Soft Tissue Infection 1000 mL 0   • TRUETEST TEST test strip CHECK BLOOD SUGAR TWICE DAILY 100 each 5   • valsartan (DIOVAN) 320 MG tablet TAKE 1 TABLET BY MOUTH DAILY 90 tablet 0   • warfarin (COUMADIN) 5 MG tablet Take  by mouth. 1 TABLET EVERY PM       No current  "facility-administered medications for this visit.          OBJECTIVE    Ht 64.02\" (162.6 cm)  Wt 220 lb (99.8 kg)  BMI 37.74 kg/m2      Review of Systems   Constitutional:  Denies recent weight loss, weight gain, fever or chills, no change in exercise tolerance  Musculoskeletal:Foot pain  Skin:  Right foot ulcer.  Neurological:  Burning sensations to feet b/l.  Psychiatric/Behavioral: Denies depression    Physical Exam   Constitutional: He appears well-developed and well-nourished.   HEENT: Normocephalic. Atraumatic  CV: No tenderness. RRR  Resp: Non-labored respiration. No wheezes.   Lymphatic: No lymphadenopathy.   Psychiatric: He has a normal mood and affect. her   behavior is normal.      Lower Extremity Exam:  Vascular: DP/PT pulses non-palpable +signals on doppler.   Negative hair growth.   1+  Edema  Toes cool, CFT delayed  Neuro: Protective sensation absent, b/l.  DTRs hyperreflexive  Integument: No lesions.  Right medial forefoot incision coapted with sutures in place. No SOI  Web spaces c/d/i  Musculoskeletal: LE muscle strength 4/5.   Gait in wheelchair  Flexure contracture of hip, knee on right   Mild HAV with decreased ROM  Ankle ROM decreased without pain or crepitus  STJ ROM decreased    SHANNON:  R- 0.93 L-Non-compressible      ASSESSMENT AND PLAN    Josiah was seen today for post op recheck.    Diagnoses and all orders for this visit:    Diabetic polyneuropathy associated with type 2 diabetes mellitus    Chronic osteomyelitis of right foot    Toe amputation status, right      -Comprehensive DM foot exam performed-  -Doing well postoperatively  -Dressing changes every other day  -PRAFO to be worn at all times.  -F/u with me in 2 weeks, likely suture removal            This document has been electronically signed by Narciso Maza DPM on April 15, 2017 1:50 PM     Narciso Maza DPM  4/15/2017  1:50 PM              "

## 2017-04-27 ENCOUNTER — OFFICE VISIT (OUTPATIENT)
Dept: PODIATRY | Facility: CLINIC | Age: 72
End: 2017-04-27

## 2017-04-27 VITALS — WEIGHT: 220 LBS | HEIGHT: 64 IN | BODY MASS INDEX: 37.56 KG/M2

## 2017-04-27 DIAGNOSIS — Z89.421 TOE AMPUTATION STATUS, RIGHT: ICD-10-CM

## 2017-04-27 DIAGNOSIS — E11.42 DIABETIC POLYNEUROPATHY ASSOCIATED WITH TYPE 2 DIABETES MELLITUS (HCC): Primary | ICD-10-CM

## 2017-04-27 DIAGNOSIS — M86.671 CHRONIC OSTEOMYELITIS OF RIGHT FOOT (HCC): ICD-10-CM

## 2017-04-27 PROCEDURE — 99024 POSTOP FOLLOW-UP VISIT: CPT | Performed by: PODIATRIST

## 2017-04-27 NOTE — PROGRESS NOTES
Josiah Del Castillo  1945  71 y.o. male   PCP: Amirah Archuleta MD  Patient presents today s/p right partial 1st ray amputation    04/27/2017      Chief Complaint   Patient presents with   • Right Foot - post op recheck           History of Present Illness    Mr Del Castillo is a 71-year-old male with history of diabetes and CVA with subsequent lower extremity contractures.  He is s/p right partial 1st ray amputation on 3/29/17. Doing well. Still in nursing home who are providing dressing changes.    Past Medical History:   Diagnosis Date   • Abscess of scalp    • Acute bronchitis    • Carotid artery stenosis     Athrosclerosis complete right ICA 70% left ICA   • Cellulitis of head (any part, except face)     Left ear     • Cellulitis of scalp    • Cerebrovascular accident    • Dizziness and giddiness    • Essential hypertension    • Hemiplegia     (L)      • History of respiratory therapy 06/19/2013    Nebulizer Treatment 37078 (1)  - RNatacha Negro   • Hyperlipidemia    • Hypertensive disorder    • Impacted cerumen    • Impaired glucose tolerance associated with insulin receptor abnormality    • Malignant neoplasm of prostate     Screening for malignant neoplasm of prostate    • 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;  Service:          Family History   Problem Relation Age of Onset   • Diabetes Other    • Heart disease Other          Social History     Social History   • Marital  status:      Spouse name: N/A   • Number of children: N/A   • Years of education: N/A     Occupational History   • Not on file.     Social History Main Topics   • Smoking status: Former Smoker   • Smokeless tobacco: Not on file   • Alcohol use No   • Drug use: No   • Sexual activity: Defer     Other Topics Concern   • Not on file     Social History Narrative         Current Outpatient Prescriptions   Medication Sig Dispense Refill   • albuterol (PROVENTIL) (2.5 MG/3ML) 0.083% nebulizer solution Take 2.5 mg by nebulization Every 6 (Six) Hours As Needed.     • aspirin 81 MG EC tablet Take 1 tablet by mouth Daily. 30 tablet 2   • atorvastatin (LIPITOR) 40 MG tablet Take 1 tablet by mouth Daily. 30 tablet 5   • citalopram (CeleXA) 40 MG tablet TAKE 1 TABLET BY MOUTH DAILY **NEEDS LAB WORK** 30 tablet 0   • Cyanocobalamin-Methylcobalamin 600-600 MCG sublingual tablet Place 1 tablet under the tongue Daily.     • docusate sodium 100 MG capsule Take 100 mg by mouth 2 (Two) Times a Day As Needed for Constipation for up to 30 days. 30 capsule 1   • glucose blood (SOLUS V2 TEST) test strip Use one strip to test blood sugar 2 times every day for diabetes     • Insulin Pen Needle 30G X 8 MM misc 1 each Daily. 30 each 5   • LANTUS SOLOSTAR 100 UNIT/ML injection pen INJECT 20 UNITS SUBCUTANEOUSLY EVERY NIGHT AT BEDTIME 15 mL PRN   • nystatin (MYCOSTATIN) 255237 UNIT/GM cream Apply  topically As Needed (Excoriation). 5 g 0   • PHARMACY CONSULT Continuous As Needed (monitor due to renal function.). 1 each 0   • piperacillin-tazobactam (ZOSYN) 2-0.25 GM/50ML IVPB Infuse 50 mL into a venous catheter Every 6 (Six) Hours for 28 days. Indications: Skin and Soft Tissue Infection 1000 mL 0   • TRUETEST TEST test strip CHECK BLOOD SUGAR TWICE DAILY 100 each 5   • valsartan (DIOVAN) 320 MG tablet TAKE 1 TABLET BY MOUTH DAILY 90 tablet 0   • warfarin (COUMADIN) 5 MG tablet Take  by mouth. 1 TABLET EVERY PM       No current  "facility-administered medications for this visit.          OBJECTIVE    Ht 64.02\" (162.6 cm)  Wt 220 lb (99.8 kg)  BMI 37.74 kg/m2      Review of Systems   Constitutional:  Denies recent weight loss, weight gain, fever or chills, no change in exercise tolerance  Musculoskeletal:Foot pain  Skin:  Right foot ulcer.  Neurological:  Burning sensations to feet b/l.  Psychiatric/Behavioral: Denies depression    Physical Exam   Constitutional: He appears well-developed and well-nourished.   HEENT: Normocephalic. Atraumatic  CV: No tenderness. RRR  Resp: Non-labored respiration. No wheezes.   Lymphatic: No lymphadenopathy.   Psychiatric: He has a normal mood and affect. her   behavior is normal.      Lower Extremity Exam:  Vascular: DP/PT pulses non-palpable +signals on doppler.   Negative hair growth.   1+  Edema  Toes cool, CFT delayed  Neuro: Protective sensation absent, b/l.  DTRs hyperreflexive  Integument: No lesions.  Right medial forefoot incision coapted with sutures in place. No SOI  Web spaces c/d/i  Musculoskeletal: LE muscle strength 4/5.   Gait in wheelchair  Flexure contracture of hip, knee on right   Mild HAV with decreased ROM  Ankle ROM decreased without pain or crepitus  STJ ROM decreased    SHANNON:  R- 0.93 L-Non-compressible      ASSESSMENT AND PLAN    Josiah was seen today for post op recheck.    Diagnoses and all orders for this visit:    Diabetic polyneuropathy associated with type 2 diabetes mellitus    Chronic osteomyelitis of right foot    Toe amputation status, right    -Comprehensive DM foot exam performed-  -Doing well postoperatively  -Sutures removed, incision is well healed. No residual ulceration.  -PRAFO to be worn at all times.  -Recheck as needed            This document has been electronically signed by Narciso Maza DPM on April 30, 2017 6:31 PM     Narciso Maza DPM  4/30/2017  6:31 PM              "

## (undated) DEVICE — GLV SURG SENSICARE GREEN W/ALOE PF LF 6.5 STRL

## (undated) DEVICE — STERILE POLYISOPRENE POWDER-FREE SURGICAL GLOVES: Brand: PROTEXIS

## (undated) DEVICE — PRECISION THIN (9.0 X 0.38 X 31.0MM)

## (undated) DEVICE — UNDRPD BREATH 23X36 BG/10

## (undated) DEVICE — NDL HYPO ECLPS SFTY 25G 1 1/2IN

## (undated) DEVICE — GLV SURG TRIUMPH PF LTX 7 STRL

## (undated) DEVICE — GLV SURG SENSICARE ALOE LF PF SZ7.5 GRN

## (undated) DEVICE — SPNG LAP 18X18IN LF STRL PK/5

## (undated) DEVICE — CVR FLUOROSCOPE C/ARM W/TP 36X28IN

## (undated) DEVICE — GAUZE,PACKING STRIP,IODOFORM,1"X5YD,STRL: Brand: CURAD

## (undated) DEVICE — SUT ETHLN 3-0 FS118IN 663H

## (undated) DEVICE — STERILE POLYISOPRENE POWDER-FREE SURGICAL GLOVES WITH EMOLLIENT COATING: Brand: PROTEXIS

## (undated) DEVICE — PREP SOL POVIDONE/IODINE BT 4OZ

## (undated) DEVICE — SOL IRR NACL 0.9PCT BT 1000ML

## (undated) DEVICE — DRSNG ADAPTIC 3X8

## (undated) DEVICE — PK POD 60

## (undated) DEVICE — CONTAINER,SPECIMEN,OR STERILE,4OZ: Brand: MEDLINE

## (undated) DEVICE — SPNG GZ WOVN 4X4IN 12PLY 10/BX STRL

## (undated) DEVICE — GOWN,AURORA,NOREINF,RAGLAN,XL,STERILE: Brand: MEDLINE

## (undated) DEVICE — BNDG ELAS CO-FLEX SLF ADHR 3IN5YD LF2 STRL